# Patient Record
Sex: FEMALE | Race: WHITE | Employment: OTHER | URBAN - METROPOLITAN AREA
[De-identification: names, ages, dates, MRNs, and addresses within clinical notes are randomized per-mention and may not be internally consistent; named-entity substitution may affect disease eponyms.]

---

## 2017-01-12 ENCOUNTER — TELEPHONE (OUTPATIENT)
Dept: INTERNAL MEDICINE CLINIC | Age: 77
End: 2017-01-12

## 2017-01-12 NOTE — TELEPHONE ENCOUNTER
Every 4 months, the patient typically receives an epidural steroid injection at L4-L5 at [facility] with [facility] The Rx Hardcopy is on  Dr Ga huggins for a signature. Fax Number is [phone].

## 2017-01-12 NOTE — TELEPHONE ENCOUNTER
Pt called and stated she needs another Cortizone shot, so if Dr. Edwin Acevedo could put orders in so she could have it done at the radiology department.  Call back [phone]

## 2017-01-13 ENCOUNTER — TELEPHONE (OUTPATIENT)
Dept: INTERNAL MEDICINE CLINIC | Age: 77
End: 2017-01-13

## 2017-01-13 NOTE — TELEPHONE ENCOUNTER
Mr. Samky Teresa stated that he cannot make an appointment for the patient until he gets an authorization code from her insurance. He would like a call from her nurse. Delon Moore. [facility] [phone]

## 2017-01-18 ENCOUNTER — TELEPHONE (OUTPATIENT)
Dept: INTERNAL MEDICINE CLINIC | Age: 77
End: 2017-01-18

## 2017-01-18 NOTE — TELEPHONE ENCOUNTER
Left detailed message for Delon Moore. Our office has never obtained any authorizations from the insurance company for Ms Nur's injection. Her son works in radiology and in the past only requested that an handwritten prescription is sent into [facility]. In addition, our records show that the patient has Medicare B which would not need a referral.  Recommended following up with the patient's son for additional information or any other concerns.

## 2017-01-18 NOTE — TELEPHONE ENCOUNTER
Luisa Schilling called back to inform me that they will have the patient sign a waiver if the injection is not covered.

## 2017-01-18 NOTE — TELEPHONE ENCOUNTER
Ryland Mendez says its our responsibility to run insurance,He says he will not call and wait on the line again. He needs a script for patient.

## 2017-03-07 ENCOUNTER — OFFICE VISIT (OUTPATIENT)
Dept: INTERNAL MEDICINE CLINIC | Age: 77
End: 2017-03-07

## 2017-03-07 VITALS
RESPIRATION RATE: 20 BRPM | HEIGHT: 63 IN | HEART RATE: 70 BPM | WEIGHT: 161 LBS | TEMPERATURE: 98 F | BODY MASS INDEX: 28.53 KG/M2 | OXYGEN SATURATION: 97 % | DIASTOLIC BLOOD PRESSURE: 69 MMHG | SYSTOLIC BLOOD PRESSURE: 116 MMHG

## 2017-03-07 DIAGNOSIS — R07.89 CHEST TIGHTNESS OR PRESSURE: Primary | ICD-10-CM

## 2017-03-07 RX ORDER — GUAIFENESIN 100 MG/5ML
81 LIQUID (ML) ORAL DAILY
Qty: 30 TAB | Refills: 11 | Status: SHIPPED | OUTPATIENT
Start: 2017-03-07 | End: 2018-03-15

## 2017-03-07 NOTE — PROGRESS NOTES
Reviewed record in preparation for visit and have obtained necessary documentation. Identified pt with two pt identifiers(name and ). Health Maintenance Due   Topic    DTaP/Tdap/Td series (1 - Tdap)    ZOSTER VACCINE AGE 60>     GLAUCOMA SCREENING Q2Y     OSTEOPOROSIS SCREENING (DEXA)          Chief Complaint   Patient presents with    Chest Pain     tightness gone, but lasted 12-14 min, hard to breath,         Wt Readings from Last 3 Encounters:   17 161 lb (73 kg)   16 138 lb (62.6 kg)   03/25/15 158 lb (71.7 kg)     Temp Readings from Last 3 Encounters:   17 98 °F (36.7 °C) (Oral)   16 97.6 °F (36.4 °C) (Oral)   03/25/15 98.1 °F (36.7 °C) (Oral)     BP Readings from Last 3 Encounters:   17 116/69   16 118/66   16 134/68     Pulse Readings from Last 3 Encounters:   17 70   16 68   16 62           Learning Assessment:  :     Learning Assessment 2014   PRIMARY LEARNER Patient   PRIMARY LANGUAGE ENGLISH   LEARNER PREFERENCE PRIMARY LISTENING     READING   ANSWERED BY patient   RELATIONSHIP SELF       Depression Screening:  :     PHQ 2 / 9, over the last two weeks 3/7/2016   Little interest or pleasure in doing things Not at all   Feeling down, depressed or hopeless Not at all   Total Score PHQ 2 0       Fall Risk Assessment:  :     Fall Risk Assessment, last 12 mths 3/7/2016   Able to walk? Yes   Fall in past 12 months? No   Fall with injury? -   Number of falls in past 12 months -       Abuse Screening:  :     No flowsheet data found.     Coordination of Care Questionnaire:  :     1) Have you been to an emergency room, urgent care clinic since your last visit? no   Hospitalized since your last visit? no             2) Have you seen or consulted any other health care providers outside of [address] [place] [address] since your last visit? no  (Include any pap smears or colon screenings in this section.)    3) Do you have an Advance Directive on file? no    4) Are you interested in receiving information on Advance Directives? YES      Patient is accompanied by self I have received verbal consent from Sybil Darling to discuss any/all medical information while they are present in the room.

## 2017-03-07 NOTE — PATIENT INSTRUCTIONS
[facility] Activation    Thank you for requesting access to [facility]. Please follow the instructions below to securely access and download your online medical record. [facility] allows you to send messages to your doctor, view your test results, renew your prescriptions, schedule appointments, and more. How Do I Sign Up? 1. In your internet browser, go to [email or web address]  2. Click on the First Time User? Click Here link in the Sign In box. You will be redirect to the New Member Sign Up page. 3. Enter your [facility] Access Code exactly as it appears below. You will not need to use this code after youve completed the sign-up process. If you do not sign up before the expiration date, you must request a new code. [facility] Access Code: [email or web address]  Expires: 2017  4:19 PM (This is the date your [facility] access code will )    4. Enter the last four digits of your Social Security Number (xxxx) and Date of Birth (mm/dd/yyyy) as indicated and click Submit. You will be taken to the next sign-up page. 5. Create a [facility] ID. This will be your [facility] login ID and cannot be changed, so think of one that is secure and easy to remember. 6. Create a [facility] password. You can change your password at any time. 7. Enter your Password Reset Question and Answer. This can be used at a later time if you forget your password. 8. Enter your e-mail address. You will receive e-mail notification when new information is available in [address] 9. Click Sign Up. You can now view and download portions of your medical record. 10. Click the Download Summary menu link to download a portable copy of your medical information. Additional Information    If you have questions, please visit the Frequently Asked Questions section of the [facility] website at [email or web address] Remember, [facility] is NOT to be used for urgent needs. For medical emergencies, dial 911.

## 2017-03-07 NOTE — PROGRESS NOTES
Chief Complaint   Patient presents with    Chest Pain     tightness gone, but lasted 12-14 min, hard to breath,      Chest pain history:  Renata Archibald reports 1 days  12 minutes onset of chest discomfort described as tight in the both sides  chest.  The discomfort is intermittent (1-10 minutes). she is not currently having chest discomfort. she denies radiation of discomfort to the bilateral  arm. Associated symptoms include: shortness of breath. Cardiac risk factors include:  Age > 30, smoking and elevated cholesterol. she has not had a past history of cardiovascular disease and has not had recent work ups of chest pain. [facility]    03/07/17 1613   BP: 116/69   Pulse: 70   Resp: 20   Temp: 98 °F (36.7 °C)   TempSrc: Oral   SpO2: 97%   Weight: 161 lb (73 kg)   Height: 5' 3\" (1.6 m)     S1 and S2 normal, no murmurs, clicks, gallops or rubs. Regular rate and rhythm. Chest is clear; no wheezes or rales. No edema or JVD. Phillip Leon was seen today for chest pain. Diagnoses and all orders for this visit:    Chest tightness or pressure  -     REFERRAL TO CARDIOLOGY    Other orders  -     aspirin 81 mg chewable tablet; Take 1 Tab by mouth daily.       Advise see husbands cardilology in [facility]  practice

## 2017-06-05 ENCOUNTER — TELEPHONE (OUTPATIENT)
Dept: INTERNAL MEDICINE CLINIC | Age: 77
End: 2017-06-05

## 2017-06-05 NOTE — TELEPHONE ENCOUNTER
Patient requesting a script for medication for her Sciatica.  Also wants to know if she needs a Phumococal .

## 2017-06-06 ENCOUNTER — OFFICE VISIT (OUTPATIENT)
Dept: INTERNAL MEDICINE CLINIC | Age: 77
End: 2017-06-06

## 2017-06-06 VITALS
BODY MASS INDEX: 27.29 KG/M2 | RESPIRATION RATE: 16 BRPM | SYSTOLIC BLOOD PRESSURE: 102 MMHG | OXYGEN SATURATION: 98 % | DIASTOLIC BLOOD PRESSURE: 66 MMHG | WEIGHT: 154 LBS | HEART RATE: 80 BPM | HEIGHT: 63 IN

## 2017-06-06 DIAGNOSIS — M51.16 SCIATICA OF LEFT SIDE DUE TO DISPLACEMENT OF LUMBAR INTERVERTEBRAL DISC: Primary | ICD-10-CM

## 2017-06-06 RX ORDER — UREA 10 %
LOTION (ML) TOPICAL
COMMUNITY
End: 2019-10-23

## 2017-06-06 RX ORDER — METHYLPREDNISOLONE 4 MG/1
TABLET ORAL
Qty: 1 DOSE PACK | Refills: 0 | Status: SHIPPED | OUTPATIENT
Start: 2017-06-06 | End: 2018-03-15 | Stop reason: ALTCHOICE

## 2017-06-06 NOTE — PROGRESS NOTES
Chief Complaint   Patient presents with    Back Pain     Sciacta      SUBJECTIVE:   Josselyn Martinez is a 65 y.o. female who complains of low back pain for 3 week(s), positional with bending or lifting, with radiation down the legs. Precipitating factors: none recalled by the patient. Prior history of back problems: previous osteoarthritis of lumbar spine. There is no numbness in the legs. Had epidurals total 3   OBJECTIVE:  Visit Vitals    /66    Pulse 80  Comment: 80    Resp 16    Ht 5' 3\" (1.6 m)    Wt 154 lb (69.9 kg)    SpO2 98%    BMI 27.28 kg/m2      Patient appears to be in mild to moderate pain, antalgic gait noted. Lumbosacral spine area reveals no local tenderness or mass. Painful and reduced LS ROM noted. Straight leg raise is negative at 45 degrees on bilateral. DTR's, motor strength and sensation normal, including heel and toe gait. Peripheral pulses are palpable. X-Ray: shows DJD changes, likely chronic. ASSESSMENT:   degenerative disc disease without herniated disc and herniated disc likely at L4-5    PLAN:  For acute pain, rest, intermittent application of heat (do not sleep on heating pad), analgesics and muscle relaxants are recommended. Discussed longer term treatment plan of prn NSAID's and discussed a home back care exercise program with flexion exercise routine. Proper lifting with avoidance of heavy lifting discussed. Consider Physical Therapy and XRay studies if not improving. Call or return to clinic prn if these symptoms worsen or fail to improve as anticipated. Jose uJan Echeverria was seen today for back pain. Diagnoses and all orders for this visit:    Sciatica of left side due to displacement of lumbar intervertebral disc    Other orders  -     methylPREDNISolone (MEDROL DOSEPACK) 4 mg tablet;  As directed      Epidural at [facility] to be arranged hand faxed to radioplogy

## 2017-06-06 NOTE — MR AVS SNAPSHOT
Visit Information Date & Time Provider Department Dept. Phone Encounter #  
 6/6/2017  8:45 AM Aman Bob MD [facility] [phone] [id number] Upcoming Health Maintenance Date Due DTaP/Tdap/Td series (1 - Tdap) 2/18/1960 ZOSTER VACCINE AGE 60> 2/18/1999 GLAUCOMA SCREENING Q2Y 2/18/2004 OSTEOPOROSIS SCREENING (DEXA) 2/18/2004 INFLUENZA AGE 9 TO ADULT 8/1/2017 MEDICARE YEARLY EXAM 12/1/2017 COLONOSCOPY 4/7/2019 Allergies as of 6/6/2017  Review Complete On: 6/6/2017 By: Hira Toney LPN Severity Noted Reaction Type Reactions Penicillins  02/29/2012    Hives Sulfa (Sulfonamide Antibiotics)  02/29/2012    Itching, Drowsiness Current Immunizations  Reviewed on 11/30/2016 Name Date Influenza Vaccine 10/7/2014 Pneumococcal Conjugate (PCV-13) 10/12/2016 Pneumococcal Vaccine (Unspecified Type) 1/2/2009, 1/2/2004 Not reviewed this visit You Were Diagnosed With   
  
 Codes Comments Sciatica of left side due to displacement of lumbar intervertebral disc    -  Primary ICD-10-CM: M54.32 
ICD-9-CM: 722.10 Vitals BP Pulse Resp Height(growth percentile) Weight(growth percentile) SpO2  
 102/66 80 16 5' 3\" (1.6 m) 154 lb (69.9 kg) 98% BMI OB Status Smoking Status 27.28 kg/m2 Postmenopausal Former Smoker Vitals History BMI and BSA Data Body Mass Index Body Surface Area  
 27.28 kg/m 2 1.76 m 2 Preferred Pharmacy Pharmacy Name Phone [facility] #[id number]- [address] [phone] Your Updated Medication List  
  
   
This list is accurate as of: 6/6/17  9:28 AM.  Always use your most recent med list.  
  
  
  
  
 Allergy 25 mg capsule Generic drug:  diphenhydrAMINE Take 25 mg by mouth nightly as needed. aspirin 81 mg chewable tablet Take 1 Tab by mouth daily. atorvastatin 20 mg tablet Commonly known as:  LIPITOR Take 1 Tab by mouth nightly. levothyroxine 75 mcg tablet Commonly known as:  SYNTHROID  
TAKE 1 TABLET BY MOUTH EVERY DAY BEFORE BREAKFAST  
  
 melatonin 1 mg tablet Take  by mouth.  
  
 methylPREDNISolone 4 mg tablet Commonly known as:  Jackson Escalerains As directed METROGEL 1 % topical gel Generic drug:  metroNIDAZOLE Apply  to affected area daily. Use a thin layer to affected areas after washing  
  
 naproxen sodium 220 mg tablet Commonly known as:  NAPROSYN Take 440 mg by mouth two (2) times daily (with meals). NASONEX 50 mcg/actuation nasal spray Generic drug:  mometasone USE 2 SPRAYS BY BOTH NOSTRILS ROUTE DAILY. pneumococcal 13 akbar conj dip 0.5 mL Syrg injection Commonly known as:  PREVNAR-13  
0.5 mL by IntraMUSCular route once for 1 dose. triamcinolone acetonide 0.1 % ointment Commonly known as:  KENALOG Apply  to affected area two (2) times a day. use thin layer Prescriptions Printed Refills  
 pneumococcal 13 akbar conj dip (PREVNAR-13) 0.5 mL syrg injection 0 Si.5 mL by IntraMUSCular route once for 1 dose. Class: Print Route: IntraMUSCular Prescriptions Sent to Pharmacy Refills  
 methylPREDNISolone (MEDROL DOSEPACK) 4 mg tablet 0 Sig: As directed Class: Normal  
 Pharmacy: [id number] Bea Espinosa Ph #: [phone] Introducing [facility] & HEALTH SERVICES! Irais Cason introduces [employer] patient portal. Now you can access parts of your medical record, email your doctor's office, and request medication refills online. 1. In your internet browser, go to [email or web address] 2. Click on the First Time User? Click Here link in the Sign In box. You will see the New Member Sign Up page. 3. Enter your [employer] Access Code exactly as it appears below.  You will not need to use this code after youve completed the sign-up process. If you do not sign up before the expiration date, you must request a new code. · [employer] Access Code: [email or web address] Expires: 9/4/2017  9:27 AM 
 
4. Enter the last four digits of your Social Security Number (xxxx) and Date of Birth (mm/dd/yyyy) as indicated and click Submit. You will be taken to the next sign-up page. 5. Create a [employer] ID. This will be your [employer] login ID and cannot be changed, so think of one that is secure and easy to remember. 6. Create a [employer] password. You can change your password at any time. 7. Enter your Password Reset Question and Answer. This can be used at a later time if you forget your password. 8. Enter your e-mail address. You will receive e-mail notification when new information is available in [address] 9. Click Sign Up. You can now view and download portions of your medical record. 10. Click the Download Summary menu link to download a portable copy of your medical information. If you have questions, please visit the Frequently Asked Questions section of the [employer] website. Remember, [employer] is NOT to be used for urgent needs. For medical emergencies, dial 911. Now available from your iPhone and Android! Please provide this summary of care documentation to your next provider. Your primary care clinician is listed as Agnes Johnson. If you have any questions after today's visit, please call [phone].

## 2017-06-29 ENCOUNTER — TELEPHONE (OUTPATIENT)
Dept: INTERNAL MEDICINE CLINIC | Age: 77
End: 2017-06-29

## 2017-06-29 DIAGNOSIS — R09.89 BILATERAL CAROTID BRUITS: Primary | ICD-10-CM

## 2017-06-29 NOTE — LETTER
6/29/2017 11:03 AM 
 
Ms. [address] [facility] [facility] [place] [id number] Current Outpatient Prescriptions on File Prior to Visit Medication Sig Dispense Refill  melatonin 1 mg tablet Take  by mouth.  methylPREDNISolone (MEDROL DOSEPACK) 4 mg tablet As directed 1 Dose Pack 0  
 aspirin 81 mg chewable tablet Take 1 Tab by mouth daily. 30 Tab 11  
 levothyroxine (SYNTHROID) 75 mcg tablet TAKE 1 TABLET BY MOUTH EVERY DAY BEFORE BREAKFAST 90 Tab 3  
 naproxen sodium (NAPROSYN) 220 mg tablet Take 440 mg by mouth two (2) times daily (with meals).  NASONEX 50 mcg/actuation nasal spray USE 2 SPRAYS BY BOTH NOSTRILS ROUTE DAILY. 1 Container 2  
 atorvastatin (LIPITOR) 20 mg tablet Take 1 Tab by mouth nightly. 90 Tab 3  
 triamcinolone acetonide (KENALOG) 0.1 % ointment Apply  to affected area two (2) times a day. use thin layer 30 g 0  
 diphenhydrAMINE (ALLERGY) 25 mg capsule Take 25 mg by mouth nightly as needed.  metroNIDAZOLE (METROGEL) 1 % topical gel Apply  to affected area daily. Use a thin layer to affected areas after washing No current facility-administered medications on file prior to visit. Sincerely, Darrius Randall MD

## 2017-11-08 ENCOUNTER — TELEPHONE (OUTPATIENT)
Dept: INTERNAL MEDICINE CLINIC | Age: 77
End: 2017-11-08

## 2017-11-08 DIAGNOSIS — Z12.39 SCREENING FOR BREAST CANCER: Primary | ICD-10-CM

## 2017-11-08 NOTE — TELEPHONE ENCOUNTER
Patient is requesting an XRAY of her breast. I asked if she wanted an order for [facility] she said no an Xray, that nothing is wrong with her breast.

## 2017-11-09 NOTE — TELEPHONE ENCOUNTER
Patient is requesting an order for Mammogram because her sister was just diagnosed with breast cancer.  Please advise

## 2017-11-14 ENCOUNTER — TELEPHONE (OUTPATIENT)
Dept: INTERNAL MEDICINE CLINIC | Age: 77
End: 2017-11-14

## 2017-11-14 NOTE — TELEPHONE ENCOUNTER
Pt asking for a specialist in regards to si-add-I-ca (spell check) wants physician at [facility]

## 2017-11-15 ENCOUNTER — TELEPHONE (OUTPATIENT)
Dept: INTERNAL MEDICINE CLINIC | Age: 77
End: 2017-11-15

## 2017-11-21 ENCOUNTER — HOSPITAL ENCOUNTER (OUTPATIENT)
Dept: MAMMOGRAPHY | Age: 77
Discharge: HOME | End: 2017-11-21
Attending: INTERNAL MEDICINE
Payer: MEDICARE

## 2017-11-21 DIAGNOSIS — Z12.39 SCREENING FOR BREAST CANCER: ICD-10-CM

## 2017-11-21 PROCEDURE — 77063 BREAST TOMOSYNTHESIS BI: CPT

## 2018-02-18 DIAGNOSIS — E02 SUBCLINICAL IODINE-DEFICIENCY HYPOTHYROIDISM: Primary | ICD-10-CM

## 2018-02-18 DIAGNOSIS — E78.00 HYPERCHOLESTEREMIA: ICD-10-CM

## 2018-03-14 ENCOUNTER — HOSPITAL ENCOUNTER (OUTPATIENT)
Dept: LAB | Age: 78
Discharge: HOME | End: 2018-03-14
Payer: MEDICARE

## 2018-03-14 PROCEDURE — 85025 COMPLETE CBC W/AUTO DIFF WBC: CPT

## 2018-03-14 PROCEDURE — 36415 COLL VENOUS BLD VENIPUNCTURE: CPT

## 2018-03-14 PROCEDURE — 80053 COMPREHEN METABOLIC PANEL: CPT

## 2018-03-14 PROCEDURE — 80061 LIPID PANEL: CPT

## 2018-03-14 PROCEDURE — 84443 ASSAY THYROID STIM HORMONE: CPT

## 2018-03-15 ENCOUNTER — OFFICE VISIT (OUTPATIENT)
Dept: INTERNAL MEDICINE CLINIC | Age: 78
End: 2018-03-15

## 2018-03-15 VITALS
SYSTOLIC BLOOD PRESSURE: 100 MMHG | DIASTOLIC BLOOD PRESSURE: 60 MMHG | RESPIRATION RATE: 16 BRPM | OXYGEN SATURATION: 98 % | HEART RATE: 68 BPM

## 2018-03-15 DIAGNOSIS — Z00.00 MEDICARE ANNUAL WELLNESS VISIT, SUBSEQUENT: ICD-10-CM

## 2018-03-15 DIAGNOSIS — D75.1 POLYCYTHEMIA, SECONDARY: Primary | ICD-10-CM

## 2018-03-15 DIAGNOSIS — E02 SUBCLINICAL IODINE-DEFICIENCY HYPOTHYROIDISM: ICD-10-CM

## 2018-03-15 DIAGNOSIS — Z13.39 SCREENING FOR ALCOHOLISM: ICD-10-CM

## 2018-03-15 DIAGNOSIS — M17.10 ARTHRITIS OF KNEE: ICD-10-CM

## 2018-03-15 DIAGNOSIS — E78.00 HYPERCHOLESTEREMIA: ICD-10-CM

## 2018-03-15 LAB
ALBUMIN SERPL-MCNC: 3.9 G/DL (ref 3.5–4.8)
ALBUMIN/GLOB SERPL: 1.9 {RATIO} (ref 1.2–2.2)
ALP SERPL-CCNC: 96 IU/L (ref 39–117)
ALT SERPL-CCNC: 13 IU/L (ref 0–32)
AST SERPL-CCNC: 20 IU/L (ref 0–40)
BASOPHILS # BLD AUTO: 0.1 X10E3/UL (ref 0–0.2)
BASOPHILS NFR BLD AUTO: 1 %
BILIRUB SERPL-MCNC: 0.4 MG/DL (ref 0–1.2)
BUN SERPL-MCNC: 19 MG/DL (ref 8–27)
BUN/CREAT SERPL: 33 (ref 12–28)
CALCIUM SERPL-MCNC: 9.2 MG/DL (ref 8.7–10.3)
CHLORIDE SERPL-SCNC: 103 MMOL/L (ref 96–106)
CHOLEST SERPL-MCNC: 162 MG/DL (ref 100–199)
CO2 SERPL-SCNC: 24 MMOL/L (ref 18–29)
CREAT SERPL-MCNC: 0.58 MG/DL (ref 0.57–1)
EOSINOPHIL # BLD AUTO: 0.3 X10E3/UL (ref 0–0.4)
EOSINOPHIL NFR BLD AUTO: 4 %
ERYTHROCYTE [DISTWIDTH] IN BLOOD BY AUTOMATED COUNT: 12.6 % (ref 12.3–15.4)
GFR SERPLBLD CREATININE-BSD FMLA CKD-EPI: 101 ML/MIN/1.73
GFR SERPLBLD CREATININE-BSD FMLA CKD-EPI: 88 ML/MIN/1.73
GLOBULIN SER CALC-MCNC: 2.1 G/DL (ref 1.5–4.5)
GLUCOSE SERPL-MCNC: 90 MG/DL (ref 65–99)
HCT VFR BLD AUTO: 42.7 % (ref 34–46.6)
HDLC SERPL-MCNC: 75 MG/DL
HGB BLD-MCNC: 14 G/DL (ref 11.1–15.9)
IMM GRANULOCYTES # BLD: 0 X10E3/UL (ref 0–0.1)
IMM GRANULOCYTES NFR BLD: 0 %
INTERPRETATION, 910389: NORMAL
LDLC SERPL CALC-MCNC: 71 MG/DL (ref 0–99)
LYMPHOCYTES # BLD AUTO: 2.8 X10E3/UL (ref 0.7–3.1)
LYMPHOCYTES NFR BLD AUTO: 35 %
MCH RBC QN AUTO: 29.9 PG (ref 26.6–33)
MCHC RBC AUTO-ENTMCNC: 32.8 G/DL (ref 31.5–35.7)
MCV RBC AUTO: 91 FL (ref 79–97)
MONOCYTES # BLD AUTO: 0.8 X10E3/UL (ref 0.1–0.9)
MONOCYTES NFR BLD AUTO: 10 %
NEUTROPHILS # BLD AUTO: 4.1 X10E3/UL (ref 1.4–7)
NEUTROPHILS NFR BLD AUTO: 50 %
PLATELET # BLD AUTO: 277 X10E3/UL (ref 150–379)
POTASSIUM SERPL-SCNC: 3.7 MMOL/L (ref 3.5–5.2)
PROT SERPL-MCNC: 6 G/DL (ref 6–8.5)
RBC # BLD AUTO: 4.68 X10E6/UL (ref 3.77–5.28)
SODIUM SERPL-SCNC: 141 MMOL/L (ref 134–144)
TRIGL SERPL-MCNC: 80 MG/DL (ref 0–149)
TSH SERPL DL<=0.005 MIU/L-ACNC: 0.74 UIU/ML (ref 0.45–4.5)
VLDLC SERPL CALC-MCNC: 16 MG/DL (ref 5–40)
WBC # BLD AUTO: 8 X10E3/UL (ref 3.4–10.8)

## 2018-03-15 RX ORDER — ATORVASTATIN CALCIUM 20 MG/1
20 TABLET, FILM COATED ORAL
Qty: 90 TAB | Refills: 3 | Status: SHIPPED | OUTPATIENT
Start: 2018-03-15 | End: 2019-04-10 | Stop reason: SDUPTHER

## 2018-03-15 RX ORDER — LEVOTHYROXINE SODIUM 75 UG/1
TABLET ORAL
Qty: 90 TAB | Refills: 3 | Status: SHIPPED | OUTPATIENT
Start: 2018-03-15 | End: 2019-04-10 | Stop reason: SDUPTHER

## 2018-03-15 NOTE — MR AVS SNAPSHOT
[address] [place] [address] [address] [facility] 
[phone] Patient: Desi Pulido MRN: [id number] [id number] Visit Information Date & Time Provider Department Dept. Phone Encounter #  
 3/15/2018  8:30 AM Raymond Hazel MD [facility] [phone] [id number] Upcoming Health Maintenance Date Due DTaP/Tdap/Td series (1 - Tdap) 2/18/1960 ZOSTER VACCINE AGE 60> 12/18/1998 Bone Densitometry (Dexa) Screening 2/18/2004 Influenza Age 5 to Adult 8/1/2017 MEDICARE YEARLY EXAM 12/1/2017 COLONOSCOPY 4/7/2019 GLAUCOMA SCREENING Q2Y 3/15/2020 Allergies as of 3/15/2018  Review Complete On: 3/15/2018 By: Cori Lema LPN Severity Noted Reaction Type Reactions Penicillins  02/29/2012    Hives Sulfa (Sulfonamide Antibiotics)  02/29/2012    Itching, Drowsiness Current Immunizations  Reviewed on 3/15/2018 Name Date Influenza Vaccine 10/7/2014 Pneumococcal Conjugate (PCV-13) 6/6/2017, 10/12/2016 Pneumococcal Vaccine (Unspecified Type) 1/2/2009 ZZZ-RETIRED (DO NOT USE) Pneumococcal Vaccine (Unspecified Type) 1/2/2004 Reviewed by Raymond Hazel MD on 3/15/2018 at  8:54 AM  
You Were Diagnosed With   
  
 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  
  
   
This list is accurate as of 3/15/18  9:06 AM.  Always use your most recent med list.  
  
  
  
  
 Allergy 25 mg capsule Generic drug:  diphenhydrAMINE Take 25 mg by mouth nightly as needed. atorvastatin 20 mg tablet Commonly known as:  LIPITOR Take 1 Tab by mouth nightly. levothyroxine 75 mcg tablet Commonly known as:  SYNTHROID  
TAKE 1 TABLET BY MOUTH EVERY DAY BEFORE BREAKFAST  
  
 melatonin 1 mg tablet Take  by mouth. METROGEL 1 % topical gel Generic drug:  metroNIDAZOLE Apply  to affected area daily. Use a thin layer to affected areas after washing  
  
 naproxen sodium 220 mg tablet Commonly known as:  NAPROSYN Take 440 mg by mouth two (2) times daily (with meals). NASONEX 50 mcg/actuation nasal spray Generic drug:  mometasone USE 2 SPRAYS BY BOTH NOSTRILS ROUTE DAILY. triamcinolone acetonide 0.1 % ointment Commonly known as:  KENALOG Apply  to affected area two (2) times a day. use thin layer Prescriptions Sent to Pharmacy Refills  
 levothyroxine (SYNTHROID) 75 mcg tablet 3 Sig: TAKE 1 TABLET BY MOUTH EVERY DAY BEFORE BREAKFAST Class: Normal  
 Pharmacy: [facility] [id number] [place] [address] Ph #: [phone]  
 atorvastatin (LIPITOR) 20 mg tablet 3 Sig: Take 1 Tab by mouth nightly. Class: Normal  
 Pharmacy: [facility] [address] [place] [address] Ph #: [phone] Route: Oral  
  
We Performed the Following CBC WITH AUTOMATED DIFF [53125 CPT(R)] LIPID PANEL [91245 CPT(R)] METABOLIC PANEL, COMPREHENSIVE [23444 CPT(R)] TSH 3RD GENERATION [08668 CPT(R)] Introducing [facility] & HEALTH SERVICES! Alia Cruz introduces [employer] patient portal. Now you can access parts of your medical record, email your doctor's office, and request medication refills online. 1. In your internet browser, go to [email or web address] 2. Click on the First Time User? Click Here link in the Sign In box. You will see the New Member Sign Up page. 3. Enter your [employer] [facility] Access Code exactly as it appears below. You will not need to use this code after youve completed the sign-up process. If you do not sign up before the expiration date, you must request a new code. · [employer] [facility] [place] Access Code: [email or web address] Expires: 6/13/2018  9:05 AM 
 
4. Enter the last four digits of your Social Security Number (xxxx) and Date of Birth (mm/dd/yyyy) as indicated and click Submit. You will be taken to the next sign-up page. 5. Create a [employer] [facility] [place] ID. This will be your [employer] [facility] [facility] [place] login ID and cannot be changed, so think of one that is secure and easy to remember. 6. Create a [employer] [facility] [place] password. You can change your password at any time. 7. Enter your Password Reset Question and Answer. This can be used at a later time if you forget your password. 8. Enter your e-mail address. You will receive e-mail notification when new information is available in [address] 9. Click Sign Up. You can now view and download portions of your medical record. 10. Click the Download Summary menu link to download a portable copy of your medical information. If you have questions, please visit the Frequently Asked Questions section of the [employer] [facility] [place] website. Remember, [facility] [facility] [place] is NOT to be used for urgent needs. For medical emergencies, dial 911. Now available from your iPhone and Android! Please provide this summary of care documentation to your next provider. Your primary care clinician is listed as Sima Maynard. If you have any questions after today's visit, please call [phone].

## 2018-03-15 NOTE — PATIENT INSTRUCTIONS

## 2018-03-15 NOTE — PROGRESS NOTES
Chief Complaint   Patient presents with    Medication Refill         This is the Subsequent Medicare Annual Wellness Exam, performed 12 months or more after the Initial AWV or the last Subsequent AWV    I have reviewed the patient's medical history in detail and updated the computerized patient record. History     Past Medical History:   Diagnosis Date    Arthritis     Chronic pain 1998    feet pain    Hypercholesterolemia     Hypertension     borderline, no medication    Other ill-defined conditions(799.89)     peripheral artery disease in bilateral feet    Other ill-defined conditions(799.89)     incontinant of urine, chest cold dr appt today    Rosacea 4/7/2011    Thyroid disease     Tobacco abuse, episodic 4/7/2011      Past Surgical History:   Procedure Laterality Date    BREAST SURGERY PROCEDURE UNLISTED      right lumpectomy    ENDOSCOPY, COLON, DIAGNOSTIC      HX APPENDECTOMY      HX ORTHOPAEDIC      podiatry,arthroscopy on knees,mortons neuroma removed    HX ORTHOPAEDIC      knee repl;acement    HX UROLOGICAL      kidney stone removed     Current Outpatient Prescriptions   Medication Sig Dispense Refill    levothyroxine (SYNTHROID) 75 mcg tablet TAKE 1 TABLET BY MOUTH EVERY DAY BEFORE BREAKFAST 90 Tab 3    atorvastatin (LIPITOR) 20 mg tablet Take 1 Tab by mouth nightly. 90 Tab 3    NASONEX 50 mcg/actuation nasal spray USE 2 SPRAYS BY BOTH NOSTRILS ROUTE DAILY. 1 Container 2    triamcinolone acetonide (KENALOG) 0.1 % ointment Apply  to affected area two (2) times a day. use thin layer 30 g 0    melatonin 1 mg tablet Take  by mouth.  naproxen sodium (NAPROSYN) 220 mg tablet Take 440 mg by mouth two (2) times daily (with meals).  diphenhydrAMINE (ALLERGY) 25 mg capsule Take 25 mg by mouth nightly as needed.  metroNIDAZOLE (METROGEL) 1 % topical gel Apply  to affected area daily.  Use a thin layer to affected areas after washing        Allergies   Allergen Reactions    Penicillins Hives    Sulfa (Sulfonamide Antibiotics) Itching and Drowsiness     Family History   Problem Relation Age of Onset    Heart Disease Brother     Hypertension Brother     Elevated Lipids Brother     Elevated Lipids Mother     Elevated Lipids Father     Breast Cancer Maternal Aunt 54    Breast Cancer Maternal Aunt      age unknown    Breast Cancer Maternal Aunt      age unknown     Social History   Substance Use Topics    Smoking status: Former Smoker     Types: Cigarettes     Quit date: 6/10/2013    Smokeless tobacco: Never Used      Comment: 3-4 cigarettes monthly    Alcohol use Yes      Comment: monthly     Patient Active Problem List   Diagnosis Code    Hypercholesteremia E78.00    Hypothyroid E03.9    Allergic rhinitis J30.9    Elevated blood pressure reading without diagnosis of hypertension R03.0    Rosacea L71.9    Polycythemia, secondary D75.1       Depression Risk Factor Screening:     PHQ over the last two weeks 3/15/2018   Little interest or pleasure in doing things Not at all   Feeling down, depressed or hopeless Not at all   Total Score PHQ 2 0     Alcohol Risk Factor Screening: On any occasion in the past three months you have had more than 3 drinks containing alcohol. no  Functional Ability and Level of Safety:   Hearing Loss  Hearing is good. Activities of Daily Living  The home contains: no safety equipment. Patient does total self care    Fall Risk  Fall Risk Assessment, last 12 mths 3/15/2018   Able to walk? Yes   Fall in past 12 months?  No   Fall with injury? -   Number of falls in past 12 months -       Abuse Screen  Patient is not abused    Cognitive Screening   Evaluation of Cognitive Function:  Has your family/caregiver stated any concerns about your memory: no  Normal    Patient Care Team   Patient Care Team:  Jaja Ramos MD as PCP - General    Assessment/Plan   Education and counseling provided:  Are appropriate based on today's review and evaluation  End-of-Life planning (with patient's consent)    Diagnoses and all orders for this visit:    1. Polycythemia, secondary  -     CBC WITH AUTOMATED DIFF    2. Hypercholesteremia  -     LIPID PANEL  -     METABOLIC PANEL, COMPREHENSIVE  -     atorvastatin (LIPITOR) 20 mg tablet; Take 1 Tab by mouth nightly. 3. Subclinical iodine-deficiency hypothyroidism  -     TSH 3RD GENERATION    4. Medicare annual wellness visit, subsequent    5. Screening for alcoholism  -     Annual  Alcohol Screen 15 min ()    Other orders  -     levothyroxine (SYNTHROID) 75 mcg tablet; TAKE 1 TABLET BY MOUTH EVERY DAY BEFORE BREAKFAST        Health Maintenance Due   Topic Date Due    DTaP/Tdap/Td series (1 - Tdap) 02/18/1960    ZOSTER VACCINE AGE 60>  12/18/1998    Bone Densitometry (Dexa) Screening  02/18/2004    Influenza Age 5 to Adult  08/01/2017    MEDICARE YEARLY EXAM  12/01/2017     SUBJECTIVE: Ma Nurse is a 77 y.o. female seen for a follow up visit; she has hyperlipidemia and no known cardiovascular conditions. Current Outpatient Prescriptions   Medication Sig Dispense Refill    levothyroxine (SYNTHROID) 75 mcg tablet TAKE 1 TABLET BY MOUTH EVERY DAY BEFORE BREAKFAST 90 Tab 3    atorvastatin (LIPITOR) 20 mg tablet Take 1 Tab by mouth nightly. 90 Tab 3    NASONEX 50 mcg/actuation nasal spray USE 2 SPRAYS BY BOTH NOSTRILS ROUTE DAILY. 1 Container 2    triamcinolone acetonide (KENALOG) 0.1 % ointment Apply  to affected area two (2) times a day. use thin layer 30 g 0    melatonin 1 mg tablet Take  by mouth.  naproxen sodium (NAPROSYN) 220 mg tablet Take 440 mg by mouth two (2) times daily (with meals).  diphenhydrAMINE (ALLERGY) 25 mg capsule Take 25 mg by mouth nightly as needed.  metroNIDAZOLE (METROGEL) 1 % topical gel Apply  to affected area daily.  Use a thin layer to affected areas after washing        Patient Active Problem List   Diagnosis Code    Hypercholesteremia E78.00    Hypothyroid E03.9    Allergic rhinitis J30.9    Elevated blood pressure reading without diagnosis of hypertension R03.0    Rosacea L71.9    Polycythemia, secondary D75.1     System Review: Cardiovascular ROS - taking medications as instructed, no medication side effects noted, patient does not perform home BP monitoring, no TIA's, no chest pain on exertion, no dyspnea on exertion, no swelling of ankles. New concerns: will need another knee replacement    Thyroid ROS: denies fatigue, weight changes, heat/cold intolerance, bowel/skin changes or CVS symptoms. Lab Results   Component Value Date/Time    TSH 0.744 03/14/2018 04:12 PM     Exam: thyroid is normal in size without nodules or tenderness. Assessment:  hypothyroidism and Hashimoto's Disease stable. Plan: current treatment plan effective, no change in therapy  orders as documented in EMR. .     OBJECTIVE:  Visit Vitals    /60    Pulse 68    Resp 16    SpO2 98%      Appearance: alert, well appearing, and in no distress and oriented to person, place, and time. General exam: CVS exam BP noted to be well controlled today in office, S1, S2 normal, no gallop, no murmur, chest clear, no JVD, no HSM, no edema. Lab review: orders written for new lab studies as appropriate; see orders, no lab studies available for review at time of visit. ASSESSMENT:  hyperlipidemia reasonably well controlled. PLAN:  current treatment plan is effective, no change in therapy  lab results and schedule of future lab studies reviewed with patient  repeat labs ordered prior to next appointment  orders and follow up as documented in patient record. 1. Polycythemia, secondary  Was high when smoking off tobacco for 5 years  - CBC WITH AUTOMATED DIFF    2. Hypercholesteremia  follow  - LIPID PANEL  - METABOLIC PANEL, COMPREHENSIVE  - atorvastatin (LIPITOR) 20 mg tablet; Take 1 Tab by mouth nightly. Dispense: 90 Tab; Refill: 3    3.  Subclinical iodine-deficiency hypothyroidism  On RX   - TSH 3RD GENERATION    4. Medicare annual wellness visit, subsequent  Diet   Exercise classes discussed    5.  Screening for alcoholism  1 glass wine per day only  - Annual  Alcohol Screen 15 min ()  6 knee arthritis  Did well 1st surgery reviewed indications for another surgery

## 2018-06-21 ENCOUNTER — OFFICE VISIT (OUTPATIENT)
Dept: INTERNAL MEDICINE CLINIC | Age: 78
End: 2018-06-21

## 2018-06-21 VITALS
WEIGHT: 146 LBS | TEMPERATURE: 98.1 F | BODY MASS INDEX: 25.87 KG/M2 | SYSTOLIC BLOOD PRESSURE: 100 MMHG | HEIGHT: 63 IN | OXYGEN SATURATION: 99 % | DIASTOLIC BLOOD PRESSURE: 65 MMHG | RESPIRATION RATE: 16 BRPM | HEART RATE: 68 BPM

## 2018-06-21 DIAGNOSIS — R09.89 UNEQUAL BLOOD PRESSURE IN UPPER EXTREMITIES: Primary | ICD-10-CM

## 2018-06-21 DIAGNOSIS — R09.89 BRUIT OF LEFT CAROTID ARTERY: ICD-10-CM

## 2018-06-21 NOTE — PROGRESS NOTES
Sebastian Kaur is a 77 y.o. female  Chief Complaint   Patient presents with    Pre-op Exam     Right knee surgery 7/17/18     1. Have you been to the ER, urgent care clinic since your last visit? No     Hospitalized since your last visit?no    2. Have you seen or consulted any other health care providers outside of the [address] [place] [address] since your last visit? Include any pap smears or colon screening.  no

## 2018-06-21 NOTE — PROGRESS NOTES
No chief complaint on file. Subjective:  Ms. Heron Ruvalcaba is a 77year old white female with a history of degenerative arthritis. She's planning on having a second knee replacement in about a month. She has a history of dyslipidemia, on and off tobacco abuse, has not smoked for a couple years, but has been a heavy smoker for many years. She has been on a high dose statin for a long period of time. She denies chest pain or shortness of breath. She comes in because she went to her pharmacy recently to get some blood pressure checks and they told her that her blood pressure was very low. Then they made her sit for a while and her blood pressure came up. She was supposed to bring in the readings, but she didn't. Says she left them at home. She denies chest pain. Denies arm claudication. Denies TIA. She's not had any facial weakness, no unilateral weakness, no difficulty with vision and no difficulty with her speech. High risk knee replacement coming up. Physical Examination:  BP was as noted. She had a loud left carotid bruit. Both hands were warm. Her lungs were clear. She had a regular rhythm without a murmur. Impression/Plan:  Major blood pressure difference one arm to the other, loud left carotid bruit. Underlying vascular disease is a concern. I will schedule her for a carotid doppler and for an upper extremity doppler in addition. I wonder if she has subclavian disease. May need a referral to vascular. These findings would also suggest she probably has some underlying coronary disease and may need a cardiac workup. For knee surgery in July    Low BP at drug store last week        Vitals:    06/21/18 1432 06/21/18 1444 06/21/18 1445   BP: 130/74 125/70 100/65   Pulse: 68     Resp: 16     Temp: 98.1 °F (36.7 °C)     SpO2: 99%     Weight: 146 lb (66.2 kg)     Height: 5' 3\" (1.6 m)       1. Bruit of left carotid artery  eval indicated  - DUPLEX CAROTID BILATERAL;  Future  - DUPLEX UPPER EXT ARTERY BILAT; Future    2. Unequal blood pressure in upper extremities  Reviewed worries  - DUPLEX UPPER EXT ARTERY BILAT;  Future

## 2018-07-24 ENCOUNTER — PATIENT OUTREACH (OUTPATIENT)
Dept: INTERNAL MEDICINE CLINIC | Age: 78
End: 2018-07-24

## 2018-07-24 RX ORDER — ACETAMINOPHEN 500 MG
500 TABLET ORAL AS NEEDED
COMMUNITY

## 2018-07-24 NOTE — PROGRESS NOTES
Hospital Discharge Follow-Up      Date/Time:  2018 11:50 AM    Patient was admitted to [facility] on  and discharged on  for right TKA. The physician discharge summary was available at the time of outreach. Patient was contacted within 3 business days of discharge. Top Challenges reviewed with the provider   -Unable to tolerate ASA 325mg twice daily ( DVT prophylaxis). Stopped by ortho after dark/black emesis. No further issues after stopping ASA. Inpatient RRAT score: unavailable  Was this a readmission? no   Patient stated reason for the readmission: n/a    Nurse Navigator (NN) contacted the patient by telephone to perform post hospital discharge assessment. Verified name and  with patient as identifiers. Provided introduction to self, and explanation of the Nurse Navigator role. Reviewed discharge instructions and red flags with patient who verbalized understanding. Patient given an opportunity to ask questions and does not have any further questions or concerns at this time. The patient agrees to contact the PCP office for questions related to their healthcare. NN provided contact information for future reference. Summary of patient's top problems:  1. Unequal blood pressure in upper extremities- will need carotid and upper ext. Duplex. 2. Unsteady gait-post TKA. Follett Earing for ambulation, risk for falls. 3. Dark/ black emesis post op, ortho stopped ASA and had no further s/s of bleeding. Home Health orders at discharge: [facility]: At [address] (patient not sure)  Date of initial visit: patient has had 2 visits since discharge and expects 2 more then will go to outpatient therapy.     Durable Medical Equipment ordered/company: none  Durable Medical Equipment received: patient already has walker and BSC from previous knee replacement- last year    Barriers to care? none    Advance Care Planning:   Does patient have an Advance Directive:  not on file     Medication(s):   New Medications at Discharge: tylenol extra strength  Discontinued Medications at [address]  Per patient she is not taking the following- benadryl, and triamcinolone acetonide    Patient was given ASA 325mg twice daily for  DVT prophylaxis, but stopped by ortho. Patient verbalizes understanding of DVT prevention and s/s. Patient refused prescription for pain meds and is only taking tylenol. She is monitoring intake and takes only 500mg 3-4 times daily. She reports good pain control with \"almost no pain\" if not moving with activity she rates her pain at 4/10. Pain resolves with rest.  She is using ice and exercise 3 times daily. She elevates her leg when resting. She has been using warm prune juice to prevent constipation. She initially had issues with constipation, but is now back to normal bowel regimen. She denies any s/s of blood in stool. Medication reconciliation was performed with spouse, who verbalizes understanding of administration of home medications. There were no barriers to obtaining medications identified at this time. Referral to Pharm D needed: no     Current Outpatient Prescriptions   Medication Sig    acetaminophen (TYLENOL EXTRA STRENGTH) 500 mg tablet Take 500 mg by mouth every six (6) hours as needed for Pain.  levothyroxine (SYNTHROID) 75 mcg tablet TAKE 1 TABLET BY MOUTH EVERY DAY BEFORE BREAKFAST    atorvastatin (LIPITOR) 20 mg tablet Take 1 Tab by mouth nightly.  melatonin 1 mg tablet Take  by mouth.  NASONEX 50 mcg/actuation nasal spray USE 2 SPRAYS BY BOTH NOSTRILS ROUTE DAILY.  metroNIDAZOLE (METROGEL) 1 % topical gel Apply  to affected area daily. Use a thin layer to affected areas after washing      No current facility-administered medications for this visit.         Medications Discontinued During This Encounter   Medication Reason    naproxen sodium (NAPROSYN) 220 mg tablet Discontinued at Discharge    diphenhydrAMINE (ALLERGY) 25 mg capsule Discontinued at Discharge    triamcinolone acetonide (KENALOG) 0.1 % ointment Patient Discharge       [facility] follow up appointment(s): will follow up with  when patient is more mobile. Non-[facility] follow up appointment(s): - ortho follow up 7/30/18    Goals Addressed      home safety/fall prevention                  7/24  Patient will use a walker for ambulation. She will advance to cane or other assistive devices as recommended by therapy/ortho to prevent falls. BEB       Returns to baseline activity level. 7/24  Patient will continue to work with therapy. Currently ambulating with walker. Will progress to cane in the next week when follow up with ortho. BEB       Understands red flags post discharge. 7/24  Patient verbalizes understanding of post op complications. She will continue ankle pumps and ambulating every 1-2 hours to help prevent DVT. Patient aware of blood clot s/s. Elevating feet to reduce swelling.  BEB

## 2018-07-26 ENCOUNTER — OFFICE VISIT (OUTPATIENT)
Dept: INTERNAL MEDICINE CLINIC | Age: 78
End: 2018-07-26

## 2018-07-26 VITALS
RESPIRATION RATE: 18 BRPM | HEIGHT: 63 IN | SYSTOLIC BLOOD PRESSURE: 93 MMHG | TEMPERATURE: 97 F | HEART RATE: 70 BPM | OXYGEN SATURATION: 98 % | DIASTOLIC BLOOD PRESSURE: 61 MMHG

## 2018-07-26 DIAGNOSIS — Z96.651 TOTAL KNEE REPLACEMENT STATUS, RIGHT: ICD-10-CM

## 2018-07-26 DIAGNOSIS — K92.0 HEMATEMESIS WITH NAUSEA: Primary | ICD-10-CM

## 2018-07-26 DIAGNOSIS — Z78.9 DEEP VEIN THROMBOSIS (DVT) PROPHYLAXIS PRESCRIBED AT DISCHARGE: ICD-10-CM

## 2018-07-26 RX ORDER — FAMOTIDINE 40 MG/1
40 TABLET, FILM COATED ORAL 2 TIMES DAILY
Qty: 20 TAB | Refills: 0 | Status: SHIPPED | OUTPATIENT
Start: 2018-07-26 | End: 2018-08-05

## 2018-07-26 RX ORDER — CEPHALEXIN 500 MG/1
500 CAPSULE ORAL 3 TIMES DAILY
Qty: 15 CAP | Refills: 0 | Status: SHIPPED | OUTPATIENT
Start: 2018-07-26 | End: 2018-09-14 | Stop reason: ALTCHOICE

## 2018-07-26 NOTE — PROGRESS NOTES
10 days post op right TKR  On 8/12 vomited black liquid stopped asa 650 at that point    Subjective:  She had an elective total knee replacement done by Dr. Wendy Rush at [facility]. The surgery was done on the 16th. She was discharged about on the 18th. The day she got home the next morning she vomited some coffee ground material.  She was taking two aspirin daily as a DVT prophylaxis. She's been off aspirin for a week now. No history of previous GI bleed. No history of taking aspirin in the hospital that she remembers. We don't have the hospital inpatient record. Dr. Raulito Stahl office told her that she should see me. She denies abdominal pain, denies nausea, is eating well now. She says she's having no pain in the knee. She was concerned about the knee wound being a little oozy and some tenderness. Her  is a  and he was concerned about the wound too. She has home care coming out. Physical Examination:  She has venous insufficiency. She has some venous changes in her legs. The right calf is swollen, but this is normal postop. There is a little bit of redness and some drainage at the top of the knee replacement wound. Plan:  I will cover her for a postop infection with Cephalexin 500 t.i.d. for five days. She has an appointment to see orthopedics on Monday. I suggested Pepcid 40 b.i.d. for ten days because of the hematemesis. She could possibly start back on small dose aspirin. I would be reluctant to start her on a NOAC or other more powerful anticoagulants. She's going to continue to do ankle pumps, elevation and walk. She can go back on maybe one aspirin after she's been on the Pepcid and watched for any side effects or issues. Orthopedic appointment on Monday.     Patient Active Problem List    Diagnosis    Polycythemia, secondary    Elevated blood pressure reading without diagnosis of hypertension    Rosacea    Allergic rhinitis    Hypercholesteremia    Hypothyroid Vitals:    07/26/18 1006   BP: 93/61   Pulse: 70   Resp: 18   Temp: 97 °F (36.1 °C)   TempSrc: Oral   SpO2: 98%   Height: 5' 3\" (1.6 m)     no apparent distress      1. Hematemesis with nausea  Try pepcid for 10 days    2. Total knee replacement status, right      3. Deep vein thrombosis (DVT) prophylaxis prescribed at discharge  None for a week so at risk  Retry asa 325 one    4. Wound infection after surgery, initial encounter  Keflex for 5 days      Diagnoses and all orders for this visit:    1. Hematemesis with nausea    2. Total knee replacement status, right    3. Deep vein thrombosis (DVT) prophylaxis prescribed at discharge    4. Wound infection after surgery, initial encounter    Other orders  -     cephALEXin (KEFLEX) 500 mg capsule; Take 1 Cap by mouth three (3) times daily. -     famotidine (PEPCID) 40 mg tablet; Take 1 Tab by mouth two (2) times a day for 10 days.

## 2018-08-23 ENCOUNTER — PATIENT OUTREACH (OUTPATIENT)
Dept: INTERNAL MEDICINE CLINIC | Age: 78
End: 2018-08-23

## 2018-08-23 NOTE — PROGRESS NOTES
Patient has graduated from the Transitions of Care Coordination  program on 8/23. Patient's symptoms are stable at this time. Patient/family has the ability to self-manage. Care management goals have been completed at this time. No further nurse navigator follow up scheduled. Goals Addressed      COMPLETED: home safety/fall prevention                  7/24  Patient will use a walker for ambulation. She will advance to cane or other assistive devices as recommended by therapy/ortho to prevent falls. BEB       COMPLETED: Understands red flags post discharge. 7/24  Patient verbalizes understanding of post op complications. She will continue ankle pumps and ambulating every 1-2 hours to help prevent DVT. Patient aware of blood clot s/s. Elevating feet to reduce swelling. BEB            Pt has nurse navigator's contact information for any further questions, concerns, or needs. Patients upcoming visits:  No future appointments.

## 2018-08-30 DIAGNOSIS — R09.89 BRUIT OF LEFT CAROTID ARTERY: ICD-10-CM

## 2018-09-14 ENCOUNTER — OFFICE VISIT (OUTPATIENT)
Dept: INTERNAL MEDICINE CLINIC | Age: 78
End: 2018-09-14

## 2018-09-14 VITALS
WEIGHT: 145.2 LBS | OXYGEN SATURATION: 98 % | DIASTOLIC BLOOD PRESSURE: 74 MMHG | HEART RATE: 68 BPM | HEIGHT: 63 IN | TEMPERATURE: 96 F | BODY MASS INDEX: 25.73 KG/M2 | RESPIRATION RATE: 18 BRPM | SYSTOLIC BLOOD PRESSURE: 115 MMHG

## 2018-09-14 DIAGNOSIS — H93.13 TINNITUS OF BOTH EARS: Primary | ICD-10-CM

## 2018-09-14 PROBLEM — G45.8 SUBCLAVIAN STEAL SYNDROME: Status: ACTIVE | Noted: 2018-09-14

## 2018-09-14 NOTE — PROGRESS NOTES
1. Have you been to the ER, urgent care clinic since your last visit? Hospitalized since your last visit? No    2. Have you seen or consulted any other health care providers outside of the [facility] since your last visit? Include any pap smears or colon screening.  No

## 2018-09-14 NOTE — MR AVS SNAPSHOT
[address] [place] [address] [address] [place] [address] 
[phone] Patient: Gallito Connor MRN: [id number] EOU:[id number] Visit Information Date & Time Provider Department Dept. Phone Encounter #  
 9/14/2018  3:00 PM Derald Goodpasture, MD [address] [facility] [phone] [id number] Upcoming Health Maintenance Date Due DTaP/Tdap/Td series (1 - Tdap) 2/18/1960 ZOSTER VACCINE AGE 60> 12/18/1998 Bone Densitometry (Dexa) Screening 2/18/2004 Influenza Age 5 to Adult 8/1/2018 MEDICARE YEARLY EXAM 3/16/2019 COLONOSCOPY 4/7/2019 GLAUCOMA SCREENING Q2Y 3/15/2020 Allergies as of 9/14/2018  Review Complete On: 9/14/2018 By: Samantha Barry LPN Severity Noted Reaction Type Reactions Penicillins  02/29/2012    Hives Sulfa (Sulfonamide Antibiotics)  02/29/2012    Itching, Drowsiness Current Immunizations  Reviewed on 6/21/2018 Name Date Influenza Vaccine 10/7/2014 Pneumococcal Conjugate (PCV-13) 6/6/2017, 10/12/2016 Pneumococcal Vaccine (Unspecified Type) 1/2/2009 ZZZ-RETIRED (DO NOT USE) Pneumococcal Vaccine (Unspecified Type) 1/2/2004 Not reviewed this visit You Were Diagnosed With   
  
 Codes Comments Tinnitus of both ears    -  Primary ICD-10-CM: H93.13 
ICD-9-CM: 388.30 Vitals BP Pulse Temp Resp Height(growth percentile) Weight(growth percentile) 115/74 (BP 1 Location: Left arm, BP Patient Position: Sitting) 68 96 °F (35.6 °C) (Oral) 18 5' 3\" (1.6 m) 145 lb 3.2 oz (65.9 kg) SpO2 BMI OB Status Smoking Status 98% 25.72 kg/m2 Postmenopausal Former Smoker Vitals History BMI and BSA Data Body Mass Index Body Surface Area 25.72 kg/m 2 1.71 m 2 Preferred Pharmacy Pharmacy Name Phone [facility] [place] [address] [phone] Your Updated Medication List  
  
   
 This list is accurate as of 9/14/18  3:33 PM.  Always use your most recent med list.  
  
  
  
  
 atorvastatin 20 mg tablet Commonly known as:  LIPITOR Take 1 Tab by mouth nightly. levothyroxine 75 mcg tablet Commonly known as:  SYNTHROID  
TAKE 1 TABLET BY MOUTH EVERY DAY BEFORE BREAKFAST  
  
 melatonin 1 mg tablet Take  by mouth. NASONEX 50 mcg/actuation nasal spray Generic drug:  mometasone USE 2 SPRAYS BY BOTH NOSTRILS ROUTE DAILY. TYLENOL EXTRA STRENGTH 500 mg tablet Generic drug:  acetaminophen Take 500 mg by mouth every six (6) hours as needed for Pain. We Performed the Following REFERRAL TO AUDIOLOGY [REF7 Custom] Referral Information Referral ID Referred By Referred To  
  
 [id number] Delia RAY Not Available Visits Status Start Date End Date 1 New Request 9/14/18 9/14/19 If your referral has a status of pending review or denied, additional information will be sent to support the outcome of this decision. Introducing [facility]! [facility] introduces [employer] patient portal. Now you can access parts of your medical record, email your doctor's office, and request medication refills online. 1. In your internet browser, go to [email or web address] 2. Click on the First Time User? Click Here link in the Sign In box. You will see the New Member Sign Up page. 3. Enter your [employer] Access Code exactly as it appears below. You will not need to use this code after youve completed the sign-up process. If you do not sign up before the expiration date, you must request a new code. · [employer] Access Code: [email or web address] Expires: 12/13/2018  3:33 PM 
 
4. Enter the last four digits of your Social Security Number (xxxx) and Date of Birth (mm/dd/yyyy) as indicated and click Submit. You will be taken to the next sign-up page. 5. Create a [employer] ID.  This will be your [employer] login ID and cannot be changed, so think of one that is secure and easy to remember. 6. Create a [employer] password. You can change your password at any time. 7. Enter your Password Reset Question and Answer. This can be used at a later time if you forget your password. 8. Enter your e-mail address. You will receive e-mail notification when new information is available in [address] 9. Click Sign Up. You can now view and download portions of your medical record. 10. Click the Download Summary menu link to download a portable copy of your medical information. If you have questions, please visit the Frequently Asked Questions section of the [employer] website. Remember, [employer] is NOT to be used for urgent needs. For medical emergencies, dial 911. Now available from your iPhone and Android! Please provide this summary of care documentation to your next provider. Your primary care clinician is listed as Babita Young. If you have any questions after today's visit, please call [phone].

## 2018-09-15 NOTE — PROGRESS NOTES
Chief Complaint   Patient presents with    Ringing in Ear     Subjective:  Has a history of mild hearing loss and low grade tinnitus for a number of years. Recently the tinnitus has been more pronounced. It's not pulsatile, continuous high pitch. She's noted some difficulty with hearing in groups. Denies any earache or ear pain. She's not had any upper respiratory symptoms or sinus congestion. She has been recovering from a knee replacement nicely. Meds were reviewed. She's had no nystagmus. No major vertigo type symptoms or ataxia. Physical Examination:  Her blood pressure was normal. Cardiac regular. No neck mass. Throat was normal.  Ears were normal with no serious issues. She could hear a tuning fork bilaterally, didn't seem to lateralize. Plan:  She has tinnitus probably related to hearing loss and I will refer her to audiology for evaluation. Patient Active Problem List    Diagnosis    Subclavian steal syndrome    Polycythemia, secondary    Elevated blood pressure reading without diagnosis of hypertension    Rosacea    Allergic rhinitis    Hypercholesteremia    Hypothyroid     Vitals:    09/14/18 1505   BP: 115/74   Pulse: 68   Resp: 18   Temp: 96 °F (35.6 °C)   TempSrc: Oral   SpO2: 98%   Weight: 145 lb 3.2 oz (65.9 kg)   Height: 5' 3\" (1.6 m)     Please see systems review in HPI. Diagnoses and all orders for this visit:    1.  Tinnitus of both ears  -     REFERRAL TO AUDIOLOGY

## 2019-02-20 ENCOUNTER — OFFICE VISIT (OUTPATIENT)
Dept: INTERNAL MEDICINE CLINIC | Age: 79
End: 2019-02-20

## 2019-02-20 VITALS
SYSTOLIC BLOOD PRESSURE: 122 MMHG | HEIGHT: 63 IN | RESPIRATION RATE: 18 BRPM | BODY MASS INDEX: 26.22 KG/M2 | OXYGEN SATURATION: 100 % | DIASTOLIC BLOOD PRESSURE: 80 MMHG | WEIGHT: 148 LBS | HEART RATE: 63 BPM | TEMPERATURE: 96.3 F

## 2019-02-20 DIAGNOSIS — M89.8X1 CHRONIC SCAPULAR PAIN: Primary | ICD-10-CM

## 2019-02-20 DIAGNOSIS — G89.29 CHRONIC SCAPULAR PAIN: Primary | ICD-10-CM

## 2019-02-20 NOTE — PROGRESS NOTES
Subjective: Chief Complaint Patient presents with  Pain (Chronic)  
  sciatica  Back Pain  
  left thoracic or scapular pain not respiratory no injury or fall  better some with aleve She is a [de-identified]y.o. year old female who presents for evaluation. Objective:  
 
Vitals:  
 02/20/19 1500 02/20/19 1518 BP: 190/88 122/80 Pulse: 63 Resp: 18 Temp: 96.3 °F (35.7 °C) TempSrc: Oral   
SpO2: 100% Weight: 148 lb (67.1 kg) Height: 5' 3\" (1.6 m) Physical Examination: General appearance - alert, well appearing, and in no distress, oriented to person, place, and time and normal appearing weight Neck - supple, no significant adenopathy Chest - clear to auscultation, no wheezes, rales or rhonchi, symmetric air entry No rash has minor tenderness in  Thoracic spine and scapular area Heart - normal rate, regular rhythm, normal S1, S2, no murmurs, rubs, clicks or gallops Abdomen - soft, nontender, nondistended, no masses or organomegaly Extremities - peripheral pulses normal, no pedal edema, no clubbing or cyanosis Assessment/ Plan: ICD-10-CM ICD-9-CM 1. Chronic scapular pain M89.8X1 733.90 XR SPINE THORAC 3 V  
 G89.29 338.29 Use tylenol and aleve rule out compression Follow-up Disposition: Not on File

## 2019-02-20 NOTE — PROGRESS NOTES
1. Have you been to the ER, urgent care clinic since your last visit? Hospitalized since your last visit? No 
 
2. Have you seen or consulted any other health care providers outside of the [address] [place] [address] since your last visit? Include any pap smears or colon screening.  No

## 2019-03-22 ENCOUNTER — OFFICE VISIT (OUTPATIENT)
Dept: INTERNAL MEDICINE CLINIC | Age: 79
End: 2019-03-22

## 2019-03-22 ENCOUNTER — HOSPITAL ENCOUNTER (OUTPATIENT)
Dept: LAB | Age: 79
Discharge: HOME | End: 2019-03-22
Payer: MEDICARE

## 2019-03-22 VITALS
HEART RATE: 66 BPM | RESPIRATION RATE: 20 BRPM | DIASTOLIC BLOOD PRESSURE: 73 MMHG | WEIGHT: 147 LBS | OXYGEN SATURATION: 98 % | HEIGHT: 63 IN | SYSTOLIC BLOOD PRESSURE: 139 MMHG | BODY MASS INDEX: 26.05 KG/M2 | TEMPERATURE: 97.5 F

## 2019-03-22 DIAGNOSIS — M79.674 GREAT TOE PAIN, RIGHT: Primary | ICD-10-CM

## 2019-03-22 DIAGNOSIS — R51.9 TEMPORAL PAIN: ICD-10-CM

## 2019-03-22 PROCEDURE — 86141 C-REACTIVE PROTEIN HS: CPT

## 2019-03-22 PROCEDURE — 36415 COLL VENOUS BLD VENIPUNCTURE: CPT

## 2019-03-22 PROCEDURE — 85651 RBC SED RATE NONAUTOMATED: CPT

## 2019-03-22 NOTE — PROGRESS NOTES
Bilateral foot pain, achy, tender, painful to stand, patient does not have a podiatrist or neurologist.

## 2019-03-23 LAB
CRP SERPL HS-MCNC: 0.48 MG/L (ref 0–3)
ERYTHROCYTE [SEDIMENTATION RATE] IN BLOOD BY WESTERGREN METHOD: 4 MM/HR (ref 0–40)

## 2019-03-24 NOTE — PROGRESS NOTES
Archana Mckeon is a [de-identified] y.o. female. HPI  Patient presents to the office for two concerns. The first concern is about her feet. She reports she has had previous foot surgeries in the past. Her most recent concern has to do with her big toe. She had a callus on her big toe and her  tried cutting it off. She states now the toe is tender and he was concerned her toe was infected so he advised her to be seen. She states she has not seen a podiatrist in many years but she know she should see one. Her second concern has to do with intermittent quick little sharp pain of her right temporal area. She states this has been going on for about 2 days or so. She states it is not constant and it does not effect her vision. No changes in speech or sensation. She has taken tylenol and it does seem to take the edge off. Review of Systems   Constitutional: Negative for chills and fever. Eyes: Negative for blurred vision, double vision, photophobia and pain. Neurological: Positive for headaches. Negative for dizziness, tingling, tremors, sensory change and speech change. Blood pressure 139/73, pulse 66, temperature 97.5 °F (36.4 °C), temperature source Oral, resp. rate 20, height 5' 3\" (1.6 m), weight 147 lb (66.7 kg), SpO2 98 %. Physical Exam   Constitutional: She appears well-developed and well-nourished. No distress. HENT:   TM's intact  Mild tenderness of the temporal area. Not able to appreciate edema of the temporal area. Eyes: Pupils are equal, round, and reactive to light. Conjunctivae are normal.   Cardiovascular: Normal rate and regular rhythm. Pulmonary/Chest: Effort normal and breath sounds normal.   Musculoskeletal: She exhibits tenderness and deformity. Right great toe- callus noted along the side of her great toe, at the top of callus appears to have a piece cut off and there is a scab and erythema. The toe is tender to palpation.  Right foot- some deformity noted of the toes  Left foot- deformity of toes noted. Spider veins noted along the lateral aspect of the foot       ASSESSMENT and PLAN  Diagnoses and all orders for this visit:    1. Great toe pain, right  -     REFERRAL TO PODIATRY    2. Temporal pain  -     SED RATE (ESR)  -     CRP, HIGH SENSITIVITY    I have made the patient an appointment to see the podiatrist today at 12:30. Meanwhile I have advised her no more cutting on her foot. I would like for her to get some labs done today. If the headache increase she is to see medical attention right away. She will be contact with the results of her labs once back. All this was discussed with the patient and she understands and agrees.

## 2019-03-24 NOTE — PROGRESS NOTES
Please let the patient know her labs are normal. If she is still having the pain, I would like for her to see Dr. Ortega Hansen since Dr. Alondra Stewart is out of the office this week.  thanks

## 2019-03-25 NOTE — PROGRESS NOTES
Please let the patient know her labs are normal. If she is still having the pain, I would like for her to see Dr. Jarred Romo since Dr. Odin Andrade is out of the office this week.  thanks

## 2019-03-26 ENCOUNTER — TELEPHONE (OUTPATIENT)
Dept: INTERNAL MEDICINE CLINIC | Age: 79
End: 2019-03-26

## 2019-03-26 NOTE — TELEPHONE ENCOUNTER
----- Message from Shruthi White PA-C sent at 3/23/2019  8:14 PM EDT -----  Please let the patient know her labs are normal. If she is still having the pain, I would like for her to see Dr. Mirian Pavon since Dr. Nasrin Aponte is out of the office this week.  thanks

## 2019-03-26 NOTE — TELEPHONE ENCOUNTER
Patient informed with results and states her pain is better. Advised patient if things change to call us back and make an appointment.

## 2019-04-10 DIAGNOSIS — E78.00 HYPERCHOLESTEREMIA: ICD-10-CM

## 2019-04-10 RX ORDER — LEVOTHYROXINE SODIUM 75 UG/1
TABLET ORAL
Qty: 90 TAB | Refills: 3 | Status: SHIPPED | OUTPATIENT
Start: 2019-04-10 | End: 2020-06-07

## 2019-04-10 RX ORDER — ATORVASTATIN CALCIUM 20 MG/1
20 TABLET, FILM COATED ORAL
Qty: 90 TAB | Refills: 3 | Status: SHIPPED | OUTPATIENT
Start: 2019-04-10 | End: 2019-10-23 | Stop reason: SDUPTHER

## 2019-05-02 ENCOUNTER — TELEPHONE (OUTPATIENT)
Dept: INTERNAL MEDICINE CLINIC | Age: 79
End: 2019-05-02

## 2019-05-02 NOTE — TELEPHONE ENCOUNTER
Writer took triage call for patient regarding left head stabbing pains lasting 20-30 seconds, stops and starts again in about 5 minutes, no other symptoms. Writer advised per Aliza visit \" If the headache increase she is to see medical attention right away. \" patient will be going to [address] [place] [address].

## 2019-07-29 ENCOUNTER — OFFICE VISIT (OUTPATIENT)
Dept: INTERNAL MEDICINE CLINIC | Age: 79
End: 2019-07-29

## 2019-07-29 VITALS
TEMPERATURE: 96.2 F | OXYGEN SATURATION: 97 % | RESPIRATION RATE: 18 BRPM | WEIGHT: 147 LBS | BODY MASS INDEX: 26.05 KG/M2 | HEART RATE: 67 BPM | DIASTOLIC BLOOD PRESSURE: 72 MMHG | HEIGHT: 63 IN | SYSTOLIC BLOOD PRESSURE: 145 MMHG

## 2019-07-29 DIAGNOSIS — R41.89 COGNITIVE IMPAIRMENT: Primary | ICD-10-CM

## 2019-07-29 DIAGNOSIS — R20.2 TINGLING IN EXTREMITIES: ICD-10-CM

## 2019-07-29 DIAGNOSIS — M48.061 SPINAL STENOSIS AT L4-L5 LEVEL: ICD-10-CM

## 2019-07-29 DIAGNOSIS — E78.00 HYPERCHOLESTEREMIA: ICD-10-CM

## 2019-07-29 DIAGNOSIS — E02 SUBCLINICAL IODINE-DEFICIENCY HYPOTHYROIDISM: ICD-10-CM

## 2019-07-29 NOTE — PROGRESS NOTES
1. Have you been to the ER, urgent care clinic since your last visit? Hospitalized since your last visit? No    2. Have you seen or consulted any other health care providers outside of the [address] [place] [address] since your last visit? Include any pap smears or colon screening. Yes.   Dr Bud Read

## 2019-07-30 NOTE — PROGRESS NOTES
No chief complaint on file. Subjective: This is an [de-identified]year old female here today with her , Jono Elizalde, retired . Ms. Senait Carlson has had recurrent lower back pain. She has had sciatic pain radiating down her legs when she does anything physical.  I referred her to Dr. Tony Garcia from [facility]. They did not do much, said that spinal stenosis is a chronic illness and just live with it, take ibuprofen and that there is no specific medicine. She has had a couple epidurals and they have given her some temporary relief. She has had TKRs. She tries stretching. She has tried a TENS unit. She has not tried acupuncture. She and her  have looked into other options. Right now she is using Aleve and Tylenol for pain. She also has had some decline in her mentation over the last year with confusion. The  says, \"I think my wife has some dementia\". There is a history in the family. 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 She has had to slow down a lot because of her back pain, has not been quite as active. Cannot stand for any long period of time. I reviewed Dr. Duran Arizmendi note, he felt there was not too much to do and I guess he looked at her old MRI from a couple years ago. He said if her symptoms worsen she should get another MRI. He did send her to PT, that did not seem to help very much. Physical Examination:  Her blood pressure was mildly elevated in the office. S1, S2 regular. Lungs clear. She was alert and oriented x three. She kept turning to her  to ask him information. We did not have enough time to do a full mini mental status exam.      Plan:  1. She has seen neuro before, but that was for benign headaches.   I will refer [facility] to [facility] because she wants to stay within that system ( [facility]) and I will see what they think, Dr. Nedra Ferrara. 2. I have discussed acupuncture, Dr. Severo Grime ____tim___________, Dr. Marques Lomeli, who is a chiropractor, was discussed, and names given to the patient and her . 3. Follow up again with Dr. Matt Zuluaga would also be an alternative to see what else he would recommend, if anything. 4. I did discourage her from back surgery. She certainly is not a candidate. for that    Patient Active Problem List    Diagnosis    Spinal stenosis at L4-L5 level    Subclavian steal syndrome    Polycythemia, secondary    Elevated blood pressure reading without diagnosis of hypertension    Rosacea    Allergic rhinitis    Hypercholesteremia    Hypothyroid     Vitals:    07/29/19 1552 07/29/19 1634   BP: 174/80 145/72   Pulse: 67    Resp: 18    Temp: 96.2 °F (35.7 °C)    TempSrc: Oral    SpO2: 97%    Weight: 147 lb (66.7 kg)    Height: 5' 3\" (1.6 m)      . 1. Cognitive impairment  eval indicated  - REFERRAL TO NEUROLOGY  - VITAMIN B12    2. Spinal stenosis at L4-L5 level  Not too many options she needs to slow down    3. Hypercholesteremia  labs  - CBC WITH AUTOMATED DIFF  - LIPID PANEL  - METABOLIC PANEL, COMPREHENSIVE    4. Subclinical iodine-deficiency hypothyroidism  lbs  - TSH 3RD GENERATION    5. Tingling in extremities    - VITAMIN B12  Hypertension (elevated blood pressure)  - My Recommendations  -Purchase a blood pressure cuff that goes around your upper arm and check blood pressure at least 3 times per week. Write down your numbers for review. Check blood pressure in the morning and evening. Rest for 5 minutes with feet elevated and arm resting on a table (at mid-chest level) when checking blood pressure  -Exercise 30-60 minutes most days of the week, preferably with a mix of cardiovascular and strength training.    Exercise can improve blood pressure, even if you do not lose weight!  -Limit alcohol intake to less than 2-3 drinks daily   -Avoid tobacco products  -Avoid caffeine, energy drinks, stimulants  -DASH diet - includes fruits, vegetables, fiber, and less than 2000 mg sodium (salt) daily. Try to eat less than 9340-1276 calories daily.    Limit fat intake.    -Avoid non-steroidal inflammatory medications (NSAIDS) such as ibuprofen, advil, motrin, aleve, and naproxyn as these may increase blood pressure if used long-term  -Avoid OTC decongestants such as pseudopherine, phenylephrine, Afrin

## 2019-07-31 ENCOUNTER — HOSPITAL ENCOUNTER (OUTPATIENT)
Dept: LAB | Age: 79
Discharge: HOME | End: 2019-07-31
Payer: MEDICARE

## 2019-07-31 PROCEDURE — 82607 VITAMIN B-12: CPT

## 2019-07-31 PROCEDURE — 80053 COMPREHEN METABOLIC PANEL: CPT

## 2019-07-31 PROCEDURE — 84443 ASSAY THYROID STIM HORMONE: CPT

## 2019-07-31 PROCEDURE — 85025 COMPLETE CBC W/AUTO DIFF WBC: CPT

## 2019-07-31 PROCEDURE — 36415 COLL VENOUS BLD VENIPUNCTURE: CPT

## 2019-07-31 PROCEDURE — 80061 LIPID PANEL: CPT

## 2019-08-01 LAB
ALBUMIN SERPL-MCNC: 3.9 G/DL (ref 3.5–4.7)
ALBUMIN/GLOB SERPL: 1.8 {RATIO} (ref 1.2–2.2)
ALP SERPL-CCNC: 87 IU/L (ref 39–117)
ALT SERPL-CCNC: 14 IU/L (ref 0–32)
AST SERPL-CCNC: 18 IU/L (ref 0–40)
BASOPHILS # BLD AUTO: 0 X10E3/UL (ref 0–0.2)
BASOPHILS NFR BLD AUTO: 1 %
BILIRUB SERPL-MCNC: 0.6 MG/DL (ref 0–1.2)
BUN SERPL-MCNC: 24 MG/DL (ref 8–27)
BUN/CREAT SERPL: 26 (ref 12–28)
CALCIUM SERPL-MCNC: 9.4 MG/DL (ref 8.7–10.3)
CHLORIDE SERPL-SCNC: 102 MMOL/L (ref 96–106)
CHOLEST SERPL-MCNC: 147 MG/DL (ref 100–199)
CO2 SERPL-SCNC: 24 MMOL/L (ref 20–29)
CREAT SERPL-MCNC: 0.91 MG/DL (ref 0.57–1)
EOSINOPHIL # BLD AUTO: 0.2 X10E3/UL (ref 0–0.4)
EOSINOPHIL NFR BLD AUTO: 3 %
ERYTHROCYTE [DISTWIDTH] IN BLOOD BY AUTOMATED COUNT: 13.2 % (ref 12.3–15.4)
GLOBULIN SER CALC-MCNC: 2.2 G/DL (ref 1.5–4.5)
GLUCOSE SERPL-MCNC: 88 MG/DL (ref 65–99)
HCT VFR BLD AUTO: 43.5 % (ref 34–46.6)
HDLC SERPL-MCNC: 62 MG/DL
HGB BLD-MCNC: 14.2 G/DL (ref 11.1–15.9)
IMM GRANULOCYTES # BLD AUTO: 0 X10E3/UL (ref 0–0.1)
IMM GRANULOCYTES NFR BLD AUTO: 0 %
INTERPRETATION, 910389: NORMAL
LDLC SERPL CALC-MCNC: 75 MG/DL (ref 0–99)
LYMPHOCYTES # BLD AUTO: 2.4 X10E3/UL (ref 0.7–3.1)
LYMPHOCYTES NFR BLD AUTO: 31 %
MCH RBC QN AUTO: 30.9 PG (ref 26.6–33)
MCHC RBC AUTO-ENTMCNC: 32.6 G/DL (ref 31.5–35.7)
MCV RBC AUTO: 95 FL (ref 79–97)
MONOCYTES # BLD AUTO: 0.7 X10E3/UL (ref 0.1–0.9)
MONOCYTES NFR BLD AUTO: 9 %
NEUTROPHILS # BLD AUTO: 4.5 X10E3/UL (ref 1.4–7)
NEUTROPHILS NFR BLD AUTO: 56 %
PLATELET # BLD AUTO: 305 X10E3/UL (ref 150–450)
POTASSIUM SERPL-SCNC: 4.6 MMOL/L (ref 3.5–5.2)
PROT SERPL-MCNC: 6.1 G/DL (ref 6–8.5)
RBC # BLD AUTO: 4.6 X10E6/UL (ref 3.77–5.28)
SODIUM SERPL-SCNC: 139 MMOL/L (ref 134–144)
TRIGL SERPL-MCNC: 48 MG/DL (ref 0–149)
TSH SERPL DL<=0.005 MIU/L-ACNC: 0.53 UIU/ML (ref 0.45–4.5)
VIT B12 SERPL-MCNC: 347 PG/ML (ref 232–1245)
VLDLC SERPL CALC-MCNC: 10 MG/DL (ref 5–40)
WBC # BLD AUTO: 7.8 X10E3/UL (ref 3.4–10.8)

## 2019-08-06 ENCOUNTER — TELEPHONE (OUTPATIENT)
Dept: INTERNAL MEDICINE CLINIC | Age: 79
End: 2019-08-06

## 2019-08-06 NOTE — TELEPHONE ENCOUNTER
Pt is requesting the phone number to the acupuncture doctor she was recommended to see. Pts number is [phone].       zahraa

## 2019-08-07 NOTE — TELEPHONE ENCOUNTER
Spoke with patient.  Advised patient with name and numbers of Dr Joel Davison [phone] and DR Teresa Pelletier [phone]

## 2019-08-19 ENCOUNTER — TELEPHONE (OUTPATIENT)
Dept: INTERNAL MEDICINE CLINIC | Age: 79
End: 2019-08-19

## 2019-08-19 NOTE — TELEPHONE ENCOUNTER
Gave patient information to who Dr. Antionette Motley previously recommended---Dr Shana Watts [phone] and DR Hudson Lopez [phone]

## 2019-10-23 ENCOUNTER — OFFICE VISIT (OUTPATIENT)
Dept: INTERNAL MEDICINE CLINIC | Age: 79
End: 2019-10-23

## 2019-10-23 ENCOUNTER — HOSPITAL ENCOUNTER (OUTPATIENT)
Dept: LAB | Age: 79
Discharge: HOME | End: 2019-10-23
Payer: MEDICARE

## 2019-10-23 VITALS
BODY MASS INDEX: 24.98 KG/M2 | TEMPERATURE: 97.6 F | OXYGEN SATURATION: 98 % | WEIGHT: 141 LBS | HEIGHT: 63 IN | SYSTOLIC BLOOD PRESSURE: 93 MMHG | RESPIRATION RATE: 18 BRPM | HEART RATE: 68 BPM | DIASTOLIC BLOOD PRESSURE: 72 MMHG

## 2019-10-23 DIAGNOSIS — K92.1 BLACK STOOL: ICD-10-CM

## 2019-10-23 DIAGNOSIS — Z12.11 SCREEN FOR COLON CANCER: ICD-10-CM

## 2019-10-23 DIAGNOSIS — E78.00 HYPERCHOLESTEREMIA: ICD-10-CM

## 2019-10-23 DIAGNOSIS — K59.00 CONSTIPATION, UNSPECIFIED CONSTIPATION TYPE: Primary | ICD-10-CM

## 2019-10-23 PROCEDURE — 85025 COMPLETE CBC W/AUTO DIFF WBC: CPT

## 2019-10-23 PROCEDURE — 36415 COLL VENOUS BLD VENIPUNCTURE: CPT

## 2019-10-23 RX ORDER — ATORVASTATIN CALCIUM 20 MG/1
20 TABLET, FILM COATED ORAL
Qty: 90 TAB | Refills: 3 | Status: SHIPPED | OUTPATIENT
Start: 2019-10-23 | End: 2020-09-21 | Stop reason: ALTCHOICE

## 2019-10-23 NOTE — PROGRESS NOTES
1. Have you been to the ER, urgent care clinic since your last visit? Hospitalized since your last visit? No    2. Have you seen or consulted any other health care providers outside of the [address] [place] [address] since your last visit? Include any pap smears or colon screening.  Yes    Chief Complaint   Patient presents with    Constipation     for about 1 month     Not fasting

## 2019-10-23 NOTE — PROGRESS NOTES
Pleas Aschoff is a [de-identified] y.o. female  Chief Complaint   Patient presents with    Constipation     for about 1 month     Visit Vitals  BP 93/72 (BP 1 Location: Left arm, BP Patient Position: At rest)   Pulse 68   Temp 97.6 °F (36.4 °C) (Oral)   Resp 18   Ht 5' 3\" (1.6 m)   Wt 141 lb (64 kg)   SpO2 98%   BMI 24.98 kg/m²      Health Maintenance Due   Topic Date Due    DTaP/Tdap/Td series (1 - Tdap) 02/18/1960    Shingrix Vaccine Age 50> (1 of 2) 02/18/1989    Bone Densitometry (Dexa) Screening  02/18/2004    MEDICARE YEARLY EXAM  03/16/2019    COLONOSCOPY  04/07/2019    Influenza Age 9 to Adult  08/01/2019       HPI  Constipation - Has been taking Metamucil x years, but recently not helping. Occasionally using glycerin suppositories in the past - using daily now. Staying hydrated.  would like pt to do a colonoscopy to screen for colon cancer - he is anxious about colon cancer because his mother had colon cancer, but pt doesn't want to undergo any anesthesia. Pt notes one BM that was dark in color after attending a wedding with very rich food that is unusual for her. Hx Hyperchol - needs refill on Lipitor. Doing well. Labs are UTD. Review of Systems   Constitutional: Negative for fever. Respiratory: Negative for shortness of breath. Cardiovascular: Negative for chest pain and palpitations. Gastrointestinal: Positive for constipation. Negative for abdominal pain, blood in stool, diarrhea, heartburn, nausea and vomiting. Neurological: Negative for dizziness, loss of consciousness and weakness. Physical Exam   Constitutional: She is oriented to person, place, and time. She appears well-developed and well-nourished. No distress. HENT:   Head: Normocephalic and atraumatic. Neck: Neck supple. No JVD present. No bruit bilateral carotid arteries. Cardiovascular: Normal rate, regular rhythm and normal heart sounds.    Pulmonary/Chest: Effort normal and breath sounds normal. No respiratory distress. Abdominal: Soft. Bowel sounds are normal. She exhibits no distension and no mass. There is no tenderness. There is no rebound and no guarding. No hernia. Musculoskeletal: She exhibits no edema. Neurological: She is alert and oriented to person, place, and time. Skin: Skin is warm and dry. She is not diaphoretic. Psychiatric: She has a normal mood and affect. Her behavior is normal. Judgment and thought content normal.   Nursing note and vitals reviewed. Diagnoses and all orders for this visit:    1. Constipation, unspecified constipation type  -   Stop Docolax and start docusate sodium (COLACE) 50 mg capsule; Take 1 Cap by mouth two (2) times a day for 90 days. 2. Hypercholesteremia  -     atorvastatin (LIPITOR) 20 mg tablet; Take 1 Tab by mouth nightly. 3. Screen for colon cancer  -     OCCULT BLOOD IMMUNOASSAY,DIAGNOSTIC    4.  Black stool  -     CBC WITH AUTOMATED DIFF

## 2019-10-24 ENCOUNTER — HOSPITAL ENCOUNTER (OUTPATIENT)
Dept: LAB | Age: 79
Discharge: HOME | End: 2019-10-24
Payer: MEDICARE

## 2019-10-24 LAB
BASOPHILS # BLD AUTO: 0 X10E3/UL (ref 0–0.2)
BASOPHILS NFR BLD AUTO: 1 %
EOSINOPHIL # BLD AUTO: 0.1 X10E3/UL (ref 0–0.4)
EOSINOPHIL NFR BLD AUTO: 2 %
ERYTHROCYTE [DISTWIDTH] IN BLOOD BY AUTOMATED COUNT: 12.3 % (ref 12.3–15.4)
HCT VFR BLD AUTO: 44.3 % (ref 34–46.6)
HGB BLD-MCNC: 14.5 G/DL (ref 11.1–15.9)
IMM GRANULOCYTES # BLD AUTO: 0 X10E3/UL (ref 0–0.1)
IMM GRANULOCYTES NFR BLD AUTO: 0 %
LYMPHOCYTES # BLD AUTO: 2.2 X10E3/UL (ref 0.7–3.1)
LYMPHOCYTES NFR BLD AUTO: 34 %
MCH RBC QN AUTO: 30.1 PG (ref 26.6–33)
MCHC RBC AUTO-ENTMCNC: 32.7 G/DL (ref 31.5–35.7)
MCV RBC AUTO: 92 FL (ref 79–97)
MONOCYTES # BLD AUTO: 0.5 X10E3/UL (ref 0.1–0.9)
MONOCYTES NFR BLD AUTO: 8 %
NEUTROPHILS # BLD AUTO: 3.6 X10E3/UL (ref 1.4–7)
NEUTROPHILS NFR BLD AUTO: 55 %
PLATELET # BLD AUTO: 232 X10E3/UL (ref 150–450)
RBC # BLD AUTO: 4.81 X10E6/UL (ref 3.77–5.28)
WBC # BLD AUTO: 6.5 X10E3/UL (ref 3.4–10.8)

## 2019-10-24 PROCEDURE — 82270 OCCULT BLOOD FECES: CPT

## 2019-10-28 ENCOUNTER — TELEPHONE (OUTPATIENT)
Dept: INTERNAL MEDICINE CLINIC | Age: 79
End: 2019-10-28

## 2019-10-28 DIAGNOSIS — R19.5 POSITIVE FIT (FECAL IMMUNOCHEMICAL TEST): Primary | ICD-10-CM

## 2019-10-28 LAB — HEMOCCULT STL QL IA: POSITIVE

## 2019-10-28 NOTE — TELEPHONE ENCOUNTER
Positive FIT test. Pt plans to call Dr. Nika Rose. Referral printed. Please mail this to pt and fax FIT/FOBT test results to Dr. Nika Rose. Thanks!

## 2019-11-01 PROBLEM — F02.80 PRIMARY PROGRESSIVE APHASIA (HCC): Status: ACTIVE | Noted: 2019-11-01

## 2019-11-01 PROBLEM — G31.01 PRIMARY PROGRESSIVE APHASIA (HCC): Status: ACTIVE | Noted: 2019-11-01

## 2019-12-16 ENCOUNTER — OFFICE VISIT (OUTPATIENT)
Dept: INTERNAL MEDICINE CLINIC | Age: 79
End: 2019-12-16

## 2019-12-16 VITALS
WEIGHT: 141 LBS | TEMPERATURE: 97.4 F | SYSTOLIC BLOOD PRESSURE: 126 MMHG | HEIGHT: 63 IN | RESPIRATION RATE: 20 BRPM | OXYGEN SATURATION: 97 % | HEART RATE: 63 BPM | DIASTOLIC BLOOD PRESSURE: 61 MMHG | BODY MASS INDEX: 24.98 KG/M2

## 2019-12-16 DIAGNOSIS — B35.4 TINEA CORPORIS: ICD-10-CM

## 2019-12-16 DIAGNOSIS — B35.4 TINEA CORPORIS: Primary | ICD-10-CM

## 2019-12-16 RX ORDER — CLOTRIMAZOLE AND BETAMETHASONE DIPROPIONATE 10; .64 MG/G; MG/G
CREAM TOPICAL 2 TIMES DAILY
Qty: 30 G | Refills: 1 | Status: SHIPPED | OUTPATIENT
Start: 2019-12-16 | End: 2019-12-16 | Stop reason: SDUPTHER

## 2019-12-16 NOTE — PROGRESS NOTES
Chief Complaint   Patient presents with    Skin Problem     she is a [de-identified]y.o. year old female who presents for evaluation. Patient presents the office today for evaluation of a rash. She reports that she had moles removed approximately 3 years ago. She reports she is not sure if this is related to it or not however the spots she has now are very itchy. She reports that her  has a serious rash and is being treated. She states the rash is located on the front the back and her sides. She has not tried any medication on the areas. Reviewed PmHx, RxHx, FmHx, SocHx, AllgHx and updated and dated in the chart. Review of Systems - negative except as listed above    Objective:     Vitals:    12/16/19 1311   BP: 126/61   Pulse: 63   Resp: 20   Temp: 97.4 °F (36.3 °C)   TempSrc: Oral   SpO2: 97%   Weight: 141 lb (64 kg)   Height: 5' 3\" (1.6 m)     Physical Examination: General appearance - alert, well appearing, and in no distress  Skin -multiple well demarcated circular type rash of various sizes noted on abdomen flank and back. Patient has a central rash that is approximately 3-1/2 cm in size of her back. This area is well demarcated and circular in appearance as well with some mild flaking and erythema. The area does not appear to be infected. Assessment/ Plan:   Diagnoses and all orders for this visit:    1. Tinea corporis  -     clotrimazole-betamethasone (LOTRISONE) topical cream; Apply  to affected area two (2) times a day. I explained to the patient that the area appears to be tinea. I would like for her to use the cream as prescribed. Advised her to please follow-up with Dr. Seymour Coulter if the area does not improve. Explained to her that she may have caught this from her . I have discussed the diagnosis with the patient and the intended plan as seen in the above orders. The patient has received an after-visit summary and questions were answered concerning future plans. Medication Side Effects and Warnings were discussed with patient: yes  Patient Labs were reviewed and or requested: na  Patient Past Records were reviewed and or requested  yes    Aliza Harris PA-C

## 2019-12-16 NOTE — PROGRESS NOTES
Patient having severe itching lower abdomen, around to the right and back.  Red blotches on the right hip area,

## 2019-12-17 RX ORDER — CLOTRIMAZOLE AND BETAMETHASONE DIPROPIONATE 10; .64 MG/G; MG/G
CREAM TOPICAL 2 TIMES DAILY
Qty: 30 G | Refills: 1 | Status: SHIPPED | OUTPATIENT
Start: 2019-12-17 | End: 2022-05-05

## 2020-06-22 ENCOUNTER — HOSPITAL ENCOUNTER (OUTPATIENT)
Dept: LAB | Age: 80
Discharge: HOME | End: 2020-06-22
Payer: MEDICARE

## 2020-06-22 ENCOUNTER — VIRTUAL VISIT (OUTPATIENT)
Dept: INTERNAL MEDICINE CLINIC | Age: 80
End: 2020-06-22

## 2020-06-22 DIAGNOSIS — M79.10 MYALGIA: ICD-10-CM

## 2020-06-22 DIAGNOSIS — E02 SUBCLINICAL IODINE-DEFICIENCY HYPOTHYROIDISM: ICD-10-CM

## 2020-06-22 DIAGNOSIS — E78.00 HYPERCHOLESTEREMIA: ICD-10-CM

## 2020-06-22 DIAGNOSIS — Z00.00 MEDICARE ANNUAL WELLNESS VISIT, SUBSEQUENT: Primary | ICD-10-CM

## 2020-06-22 DIAGNOSIS — F02.80 LATE ONSET ALZHEIMER'S DISEASE WITHOUT BEHAVIORAL DISTURBANCE (HCC): ICD-10-CM

## 2020-06-22 DIAGNOSIS — G30.1 LATE ONSET ALZHEIMER'S DISEASE WITHOUT BEHAVIORAL DISTURBANCE (HCC): ICD-10-CM

## 2020-06-22 PROBLEM — G31.01 PRIMARY PROGRESSIVE APHASIA (HCC): Status: RESOLVED | Noted: 2019-11-01 | Resolved: 2020-06-22

## 2020-06-22 PROCEDURE — 84443 ASSAY THYROID STIM HORMONE: CPT

## 2020-06-22 PROCEDURE — 85025 COMPLETE CBC W/AUTO DIFF WBC: CPT

## 2020-06-22 PROCEDURE — 82550 ASSAY OF CK (CPK): CPT

## 2020-06-22 PROCEDURE — 80061 LIPID PANEL: CPT

## 2020-06-22 PROCEDURE — 80053 COMPREHEN METABOLIC PANEL: CPT

## 2020-06-22 RX ORDER — ACETAMINOPHEN, DIPHENHYDRAMINE HCL, PHENYLEPHRINE HCL 325; 25; 5 MG/1; MG/1; MG/1
10 TABLET ORAL
COMMUNITY
End: 2022-06-16 | Stop reason: ALTCHOICE

## 2020-06-22 RX ORDER — ACETAMINOPHEN 500 MG
2000 TABLET ORAL 2 TIMES DAILY
COMMUNITY

## 2020-06-22 RX ORDER — TRIAMCINOLONE ACETONIDE 1 MG/G
CREAM TOPICAL
COMMUNITY
Start: 2020-05-21 | End: 2020-11-12 | Stop reason: ALTCHOICE

## 2020-06-22 RX ORDER — CETIRIZINE HCL 10 MG
10 TABLET ORAL
COMMUNITY
End: 2020-09-21 | Stop reason: ALTCHOICE

## 2020-06-22 NOTE — PATIENT INSTRUCTIONS
Medicare Wellness Visit, Female The best way to live healthy is to have a lifestyle where you eat a well-balanced diet, exercise regularly, limit alcohol use, and quit all forms of tobacco/nicotine, if applicable. Regular preventive services are another way to keep healthy. Preventive services (vaccines, screening tests, monitoring & exams) can help personalize your care plan, which helps you manage your own care. Screening tests can find health problems at the earliest stages, when they are easiest to treat. [facility] follows the current, evidence-based guidelines published by the [facility] ([facility]) when recommending preventive services for our patients. Because we follow these guidelines, sometimes recommendations change over time as research supports it. (For example, mammograms used to be recommended annually. Even though Medicare will still pay for an annual mammogram, the newer guidelines recommend a mammogram every two years for women of average risk). Of course, you and your doctor may decide to screen more often for some diseases, based on your risk and your co-morbidities (chronic disease you are already diagnosed with). Preventive services for you include: - Medicare offers their members a free annual wellness visit, which is time for you and your primary care provider to discuss and plan for your preventive service needs. Take advantage of this benefit every year! 
-All adults over the age of 72 should receive the recommended pneumonia vaccines. Current USPSTF guidelines recommend a series of two vaccines for the best pneumonia protection.  
-All adults should have a flu vaccine yearly and a tetanus vaccine every 10 years.  
-All adults age 48 and older should receive the shingles vaccines (series of two vaccines). -All adults age 38-68 who are overweight should have a diabetes screening test once every three years. -All adults born between [birth year] and 1966 should be screened once for Hepatitis C. 
-Other screening tests and preventive services for persons with diabetes include: an eye exam to screen for diabetic retinopathy, a kidney function test, a foot exam, and stricter control over your cholesterol.  
-Cardiovascular screening for adults with routine risk involves an electrocardiogram (ECG) at intervals determined by your doctor.  
-Colorectal cancer screenings should be done for adults age 54-65 with no increased risk factors for colorectal cancer. There are a number of acceptable methods of screening for this type of cancer. Each test has its own benefits and drawbacks. Discuss with your doctor what is most appropriate for you during your annual wellness visit. The different tests include: colonoscopy (considered the best screening method), a fecal occult blood test, a fecal DNA test, and sigmoidoscopy. 
 
-A bone mass density test is recommended when a woman turns 65 to screen for osteoporosis. This test is only recommended one time, as a screening. Some providers will use this same test as a disease monitoring tool if you already have osteoporosis. -Breast cancer screenings are recommended every other year for women of normal risk, age 54-69. 
-Cervical cancer screenings for women over age 72 are only recommended with certain risk factors. Here is a list of your current Health Maintenance items (your personalized list of preventive services) with a due date: 
Health Maintenance Due Topic Date Due  
 DTaP/Tdap/Td  (1 - Tdap) 02/18/1960  Shingles Vaccine (1 of 2) 02/18/1989  Bone Mineral Density   02/18/2004 Peg Prabhakar Annual Well Visit  03/16/2019  Glaucoma Screening   03/15/2020

## 2020-06-22 NOTE — PROGRESS NOTES
This is the Subsequent Medicare Annual Wellness Exam, performed 12 months or more after the Initial AWV or the last Subsequent AWV    Consent: Esther Jhaveri, who was seen by synchronous (real-time) audio-video technology, and/or her healthcare decision maker, is aware that this patient-initiated, Telehealth encounter on 6/22/2020 is a billable service. While AWVs are fully covered by Medicare, any services rendered on this date that are not included in an AWV are subject to additional billing, with coverage as determined by her insurance carrier. She is aware that she may receive a bill for any such additional services and has provided verbal consent to proceed: Yes. I have reviewed the patient's medical history in detail and updated the computerized patient record.      History     Patient Active Problem List   Diagnosis Code    Hypercholesteremia E78.00    Hypothyroid E03.9    Allergic rhinitis J30.9    Elevated blood pressure reading without diagnosis of hypertension R03.0    Rosacea L71.9    Polycythemia, secondary D75.1    Subclavian steal syndrome G45.8    Spinal stenosis at L4-L5 level M48.061     Past Medical History:   Diagnosis Date    Arthritis     Chronic pain 1998    feet pain    Hypercholesterolemia     Hypertension     borderline, no medication    Other ill-defined conditions(799.89)     peripheral artery disease in bilateral feet    Other ill-defined conditions(799.89)     incontinant of urine, chest cold dr appt today    Rosacea 4/7/2011    Thyroid disease     Tobacco abuse, episodic 4/7/2011      Past Surgical History:   Procedure Laterality Date    BREAST SURGERY PROCEDURE UNLISTED      right lumpectomy    ENDOSCOPY, COLON, DIAGNOSTIC      HX APPENDECTOMY      HX ORTHOPAEDIC      podiatry,arthroscopy on knees,mortons neuroma removed    HX ORTHOPAEDIC      knee repl;acement bilat 2017 2018    HX UROLOGICAL      kidney stone removed     Current Outpatient Medications Medication Sig Dispense Refill    triamcinolone acetonide (KENALOG) 0.1 % topical cream APPLY CREAM TWO TIMES DAILY TO AREAS OF RASH X2 WEEKS, BREAK FOR 1 WEEK THEN AS NEEDED      melatonin 10 mg tab Take 10 mg by mouth nightly.  cetirizine (ZYRTEC) 10 mg tablet Take 10 mg by mouth nightly.  OTHER Take 1 Each by mouth daily. zeremin for dementia      cholecalciferol (VITAMIN D3) (2,000 UNITS /50 MCG) cap capsule Take 2,000 Units by mouth two (2) times a day.  levothyroxine (SYNTHROID) 75 mcg tablet TAKE 1 TABLET BY MOUTH EVERY DAY BEFORE BREAKFAST 90 Tab 1    clotrimazole-betamethasone (LOTRISONE) topical cream Apply  to affected area two (2) times a day. 30 g 1    acetaminophen (TYLENOL EXTRA STRENGTH) 500 mg tablet Take 500 mg by mouth daily.  NASONEX 50 mcg/actuation nasal spray USE 2 SPRAYS BY BOTH NOSTRILS ROUTE DAILY. (Patient taking differently: 2 Sprays by Both Nostrils route as needed.) 1 Container 2    atorvastatin (LIPITOR) 20 mg tablet Take 1 Tab by mouth nightly.  90 Tab 3     Allergies   Allergen Reactions    Other Plant, Animal, Environmental Other (comments)     Seasonal allergies/ mold     Penicillins Hives    Sulfa (Sulfonamide Antibiotics) Itching and Drowsiness       Family History   Problem Relation Age of Onset    Heart Disease Brother     Hypertension Brother     Elevated Lipids Brother     Elevated Lipids Mother     Elevated Lipids Father     Breast Cancer Maternal Aunt 54    Breast Cancer Maternal Aunt         age unknown    Breast Cancer Maternal Aunt         age unknown     Social History     Tobacco Use    Smoking status: Former Smoker     Packs/day: 0.50     Years: 20.00     Pack years: 10.00     Types: Cigarettes     Last attempt to quit: 6/10/2013     Years since quittin.0    Smokeless tobacco: Never Used    Tobacco comment: 3-4 cigarettes monthly   Substance Use Topics    Alcohol use: Yes     Comment: monthly       Depression Risk Factor Screening:     3 most recent PHQ Screens 6/22/2020   Little interest or pleasure in doing things Not at all   Feeling down, depressed, irritable, or hopeless Not at all   Total Score PHQ 2 0       Alcohol Risk Factor Screening:   Do you average 1 drink per night or more than 7 drinks a week: On any one occasion in the past three months have you have had more than 3 drinks containing alcohol:        Functional Ability and Level of Safety:   Hearing: The patient needs further evaluation. Activities of Daily Living: The home contains: handrails and grab bars  Patient needs help with:  transportation, shopping, laundry, housework, managing medications and managing money     Ambulation: with mild difficulty     Fall Risk:  Fall Risk Assessment, last 12 mths 10/23/2019   Able to walk? Yes   Fall in past 12 months? No   Fall with injury? -   Number of falls in past 12 months -   Fall Risk Score -     Abuse Screen:  Patient is not abused       Cognitive Screening   Has your family/caregiver stated any concerns about your memory: yes - dementia    Cognitive Screening: Abnormal - Verbal Fluency Test    Patient Care Team   Patient Care Team:  Grace Rinne, MD as PCP - General  Grace Rinne, MD as PCP - [facility]    Assessment/Plan   Education and counseling provided:  Are appropriate based on today's review and evaluation    Diagnoses and all orders for this visit:    1. Medicare annual wellness visit, subsequent        Health Maintenance Due   Topic Date Due    DTaP/Tdap/Td series (1 - Tdap) 02/18/1960    Shingrix Vaccine Age 50> (1 of 2) 02/18/1989    Bone Densitometry (Dexa) Screening  02/18/2004    Medicare Yearly Exam  03/16/2019    GLAUCOMA SCREENING Q2Y  03/15/2020       John Lipoma Leslie Madison is a 79 y.o. female who was evaluated by an audio-video encounter for concerns as above. Patient identification was verified prior to start of the visit. A caregiver was present when appropriate.  Due to this being a TeleHealth encounter (During TDOHE-27 public health emergency), evaluation of the following organ systems was limited: Vitals/Constitutional/EENT/Resp/CV/GI//MS/Neuro/Skin/Heme-Lymph-Imm. Pursuant to the emergency declaration under the [address] [place] [address] [facility] authority and the [employer] Act, this Virtual Visit was conducted, with patient's (and/or legal guardian's) consent, to reduce the patient's risk of exposure to COVID-19 and provide necessary medical care. Services were provided through a synchronous discussion virtually to substitute for in-person clinic visit. I was  At office The patient was at home. Suzanne Bruno MD       Subjective:  Ms. Elda Skiff has had increased myalgias, more arthralgias. She has had bilateral knee replacements done by [facility], the last one 18-20 months ago. She has subsequently been diagnosed with dementia. She tried working in her garden for a while and developed increasing pain in her joints. They tried her on some topical Lidocaine, but she broke out. They have not tried her on a topical NSAID. Her  has given her 500 mg, one a day, of Tylenol, but no more. She has an appointment to see Dr. Glenys Tena, but it is down the road. There has not been any pain, but her bilateral knees are stiff. She denies real muscle pain, no myopathy, but some arthropathy, some pain in other joints. She has been diagnosed with dementia, has seen [facility]. She is on no meds. She is taking melatonin for sleep and she is using an internet based memory enhancer. She takes Metamucil daily. Her bowels have been okay with it and she has been getting around okay. They took her off her statin about a month ago, but it has made no difference in her arthralgias. She wanted to get repeat lipids and repeat her thyroid function.     BP Readings from Last 3 Encounters: 12/16/19 126/61   10/23/19 93/72   07/29/19 145/72     Alert oriented     . Hard of hearing    Subjective: This is a virtual visit of an 79-year-old female, who has been seen for dementia and is being followed by [facility]. She has a history of increasing knee pain recently. She did a lot of gardening. She is status post bilateral knee replacement. She has made an appointment to see orthopedics, but that is not till down the road. She has had a lot of arthralgias, no real myalgias. Her son, who is a radiologist, told her to stop her Lipitor to see if it made a difference. She has been off of it for a month, the patient says no, it is not any different, she still has a lot of joint pain. She has been taking one Tylenol a day. Patient Active Problem List    Diagnosis    Spinal stenosis at L4-L5 level    Subclavian steal syndrome    Polycythemia, secondary    Elevated blood pressure reading without diagnosis of hypertension    Rosacea    Allergic rhinitis    Hypercholesteremia    Hypothyroid     1. Medicare annual wellness visit, subsequent      2. Hypercholesteremia  Off statin  - CBC WITH AUTOMATED DIFF; Future  - LIPID PANEL; Future  - METABOLIC PANEL, COMPREHENSIVE; Future    3. Subclinical iodine-deficiency hypothyroidism  Taking faithfully  - TSH 3RD GENERATION; Future    4. Myalgia  Doubt from statin   - CK;  Future  Dementia reviewed not ptogressing now

## 2020-06-23 LAB
ALBUMIN SERPL-MCNC: 4 G/DL (ref 3.6–4.6)
ALBUMIN/GLOB SERPL: 1.8 {RATIO} (ref 1.2–2.2)
ALP SERPL-CCNC: 86 IU/L (ref 39–117)
ALT SERPL-CCNC: 12 IU/L (ref 0–32)
AST SERPL-CCNC: 24 IU/L (ref 0–40)
BASOPHILS # BLD AUTO: 0 X10E3/UL (ref 0–0.2)
BASOPHILS NFR BLD AUTO: 0 %
BILIRUB SERPL-MCNC: 0.6 MG/DL (ref 0–1.2)
BUN SERPL-MCNC: 21 MG/DL (ref 8–27)
BUN/CREAT SERPL: 28 (ref 12–28)
CALCIUM SERPL-MCNC: 9.6 MG/DL (ref 8.7–10.3)
CHLORIDE SERPL-SCNC: 104 MMOL/L (ref 96–106)
CHOLEST SERPL-MCNC: 203 MG/DL (ref 100–199)
CK SERPL-CCNC: 64 U/L (ref 26–161)
CO2 SERPL-SCNC: 23 MMOL/L (ref 20–29)
CREAT SERPL-MCNC: 0.76 MG/DL (ref 0.57–1)
EOSINOPHIL # BLD AUTO: 0.2 X10E3/UL (ref 0–0.4)
EOSINOPHIL NFR BLD AUTO: 3 %
ERYTHROCYTE [DISTWIDTH] IN BLOOD BY AUTOMATED COUNT: 12.4 % (ref 11.7–15.4)
GLOBULIN SER CALC-MCNC: 2.2 G/DL (ref 1.5–4.5)
GLUCOSE SERPL-MCNC: 78 MG/DL (ref 65–99)
HCT VFR BLD AUTO: 44.6 % (ref 34–46.6)
HDLC SERPL-MCNC: 58 MG/DL
HGB BLD-MCNC: 14.6 G/DL (ref 11.1–15.9)
IMM GRANULOCYTES # BLD AUTO: 0 X10E3/UL (ref 0–0.1)
IMM GRANULOCYTES NFR BLD AUTO: 0 %
INTERPRETATION, 910389: NORMAL
LDLC SERPL CALC-MCNC: 129 MG/DL (ref 0–99)
LYMPHOCYTES # BLD AUTO: 2.5 X10E3/UL (ref 0.7–3.1)
LYMPHOCYTES NFR BLD AUTO: 35 %
MCH RBC QN AUTO: 30.5 PG (ref 26.6–33)
MCHC RBC AUTO-ENTMCNC: 32.7 G/DL (ref 31.5–35.7)
MCV RBC AUTO: 93 FL (ref 79–97)
MONOCYTES # BLD AUTO: 0.5 X10E3/UL (ref 0.1–0.9)
MONOCYTES NFR BLD AUTO: 7 %
NEUTROPHILS # BLD AUTO: 3.9 X10E3/UL (ref 1.4–7)
NEUTROPHILS NFR BLD AUTO: 55 %
PLATELET # BLD AUTO: 234 X10E3/UL (ref 150–450)
POTASSIUM SERPL-SCNC: 4.5 MMOL/L (ref 3.5–5.2)
PROT SERPL-MCNC: 6.2 G/DL (ref 6–8.5)
RBC # BLD AUTO: 4.78 X10E6/UL (ref 3.77–5.28)
SODIUM SERPL-SCNC: 142 MMOL/L (ref 134–144)
TRIGL SERPL-MCNC: 79 MG/DL (ref 0–149)
TSH SERPL DL<=0.005 MIU/L-ACNC: 0.66 UIU/ML (ref 0.45–4.5)
VLDLC SERPL CALC-MCNC: 16 MG/DL (ref 5–40)
WBC # BLD AUTO: 7.2 X10E3/UL (ref 3.4–10.8)

## 2020-06-24 NOTE — PROGRESS NOTES
Writer contacted patient to inform of lab results and instruction per Dr. Miroslava Chakraborty, patient verbalized understanding.

## 2020-09-21 ENCOUNTER — OFFICE VISIT (OUTPATIENT)
Dept: INTERNAL MEDICINE CLINIC | Age: 80
End: 2020-09-21
Payer: MEDICARE

## 2020-09-21 VITALS
SYSTOLIC BLOOD PRESSURE: 129 MMHG | WEIGHT: 129.2 LBS | HEART RATE: 68 BPM | HEIGHT: 63 IN | RESPIRATION RATE: 18 BRPM | DIASTOLIC BLOOD PRESSURE: 72 MMHG | BODY MASS INDEX: 22.89 KG/M2 | OXYGEN SATURATION: 98 % | TEMPERATURE: 95 F

## 2020-09-21 DIAGNOSIS — F34.1 DYSTHYMIA: ICD-10-CM

## 2020-09-21 DIAGNOSIS — E78.00 HYPERCHOLESTEREMIA: ICD-10-CM

## 2020-09-21 DIAGNOSIS — F02.80 LATE ONSET ALZHEIMER'S DISEASE WITHOUT BEHAVIORAL DISTURBANCE (HCC): ICD-10-CM

## 2020-09-21 DIAGNOSIS — Z78.0 POSTMENOPAUSAL STATE: Primary | ICD-10-CM

## 2020-09-21 DIAGNOSIS — Z23 NEEDS FLU SHOT: ICD-10-CM

## 2020-09-21 DIAGNOSIS — F33.0 DEPRESSION, MAJOR, RECURRENT, MILD (HCC): ICD-10-CM

## 2020-09-21 DIAGNOSIS — G30.1 LATE ONSET ALZHEIMER'S DISEASE WITHOUT BEHAVIORAL DISTURBANCE (HCC): ICD-10-CM

## 2020-09-21 PROCEDURE — G8420 CALC BMI NORM PARAMETERS: HCPCS | Performed by: INTERNAL MEDICINE

## 2020-09-21 PROCEDURE — G8427 DOCREV CUR MEDS BY ELIG CLIN: HCPCS | Performed by: INTERNAL MEDICINE

## 2020-09-21 PROCEDURE — G0463 HOSPITAL OUTPT CLINIC VISIT: HCPCS | Performed by: INTERNAL MEDICINE

## 2020-09-21 PROCEDURE — G8536 NO DOC ELDER MAL SCRN: HCPCS | Performed by: INTERNAL MEDICINE

## 2020-09-21 PROCEDURE — G8431 POS CLIN DEPRES SCRN F/U DOC: HCPCS | Performed by: INTERNAL MEDICINE

## 2020-09-21 PROCEDURE — 1101F PT FALLS ASSESS-DOCD LE1/YR: CPT | Performed by: INTERNAL MEDICINE

## 2020-09-21 PROCEDURE — G8400 PT W/DXA NO RESULTS DOC: HCPCS | Performed by: INTERNAL MEDICINE

## 2020-09-21 PROCEDURE — 1090F PRES/ABSN URINE INCON ASSESS: CPT | Performed by: INTERNAL MEDICINE

## 2020-09-21 PROCEDURE — 99214 OFFICE O/P EST MOD 30 MIN: CPT | Performed by: INTERNAL MEDICINE

## 2020-09-21 RX ORDER — CITALOPRAM 10 MG/1
10 TABLET ORAL DAILY
Qty: 90 TAB | Refills: 1 | Status: SHIPPED | OUTPATIENT
Start: 2020-09-21 | End: 2021-08-03

## 2020-09-21 NOTE — PROGRESS NOTES
Chief Complaint   Patient presents with    Physical    Immunization/Injection     high dose flu shot     Medication Refill     90 day refills       1. Have you been to the ER, urgent care clinic since your last visit? Hospitalized since your last visit? No    2. Have you seen or consulted any other health care providers outside of the [address] [place] [address] since your last visit? Include any pap smears or colon screening.  No

## 2020-09-21 NOTE — PROGRESS NOTES
Subjective: This is a review of her status. She had a Medicare wellness done earlier this year. She has had more and more depression since being in lockdown at home. She is followed by Dr. Timur Ruiz from [facility], has dementia. He is not in favor of giving her any medications and she is happy with that. Her mood has been sad, depressed, but not suicidal.  She is stressed and anxious over the fact of things she cannot do that she used to do. She has a history of dyslipidemia, never had any coronary disease. Her son, who is a radiologist, told her maybe she should \"get off that stuff\" as he is really not an expert. She thinks she was off of it when she had her blood work in June. Her blood work in June showed her total cholesterol to be just somewhat elevated. She says she really does not note any change in the way she feels on or off the statin, has no heart disease. Taking a statin was totally for primary prevention. She has not had blood pressure troubles, just hypothyroidism and labs on that were followed and done in June, which were normal.      Physical Examination:  Her mood was depressed. She had a regular heart rhythm. She had clear lungs, soft abdomen. She moved slowly, has a bad knee and limps a little bit. She was mildly confused.     Patient Active Problem List    Diagnosis    Depression, major, recurrent, mild ([address]    Late onset Alzheimer's disease without behavioral disturbance ([address]    Spinal stenosis at L4-L5 level    Subclavian steal syndrome    Polycythemia, secondary    Elevated blood pressure reading without diagnosis of hypertension    Rosacea    Allergic rhinitis    Hypercholesteremia    Hypothyroid     Vitals:    09/21/20 1034   BP: 129/72   Pulse: 68   Resp: 18   Temp: (!) 95 °F (35 °C)   TempSrc: Temporal   SpO2: 98%   Weight: 129 lb 3.2 oz (58.6 kg)   Height: 5' 3\" (1.6 m)     Lab Results   Component Value Date/Time    TSH 0.656 06/22/2020 12:00 AM Lab Results   Component Value Date/Time    Cholesterol, total 203 (H) 06/22/2020 12:00 AM    HDL Cholesterol 58 06/22/2020 12:00 AM    LDL, calculated 129 (H) 06/22/2020 12:00 AM    VLDL, calculated 16 06/22/2020 12:00 AM    Triglyceride 79 06/22/2020 12:00 AM    CHOL/HDL Ratio 2.5 09/13/2010 10:10 AM     1. Postmenopausal state  Due reviewed reasoning  - DEXA BONE DENSITY STUDY AXIAL; Future    2. Needs flu shot    - FLU (FLUAD QUAD INFLUENZA VACCINE,QUAD,ADJUVANTED)    3. Late onset Alzheimer's disease without behavioral disturbance (HCC)  No rx    4. Hypercholesteremia  Off meds now    5. Dysthymia  Start antidepressant    6. Depression, major, recurrent, mild ([address]  Start meds see 60 days  - citalopram (CELEXA) 10 mg tablet; Take 1 Tab by mouth daily. Dispense: 90 Tab;  Refill: 1

## 2020-11-02 ENCOUNTER — TELEPHONE (OUTPATIENT)
Dept: INTERNAL MEDICINE CLINIC | Age: 80
End: 2020-11-02

## 2020-11-02 RX ORDER — ATORVASTATIN CALCIUM 20 MG/1
20 TABLET, FILM COATED ORAL DAILY
Qty: 90 TAB | Refills: 3 | Status: SHIPPED | OUTPATIENT
Start: 2020-11-02 | End: 2021-08-03 | Stop reason: SINTOL

## 2020-11-02 NOTE — TELEPHONE ENCOUNTER
Patient called requesting lab results from September.  says she stopped taking Atorvastatin. Wanted to check the range.

## 2020-11-12 ENCOUNTER — OFFICE VISIT (OUTPATIENT)
Dept: INTERNAL MEDICINE CLINIC | Age: 80
End: 2020-11-12
Payer: MEDICARE

## 2020-11-12 VITALS
OXYGEN SATURATION: 100 % | BODY MASS INDEX: 22.82 KG/M2 | SYSTOLIC BLOOD PRESSURE: 128 MMHG | TEMPERATURE: 98 F | HEART RATE: 64 BPM | HEIGHT: 63 IN | WEIGHT: 128.8 LBS | DIASTOLIC BLOOD PRESSURE: 85 MMHG | RESPIRATION RATE: 20 BRPM

## 2020-11-12 DIAGNOSIS — K43.9 VENTRAL HERNIA WITHOUT OBSTRUCTION OR GANGRENE: Primary | ICD-10-CM

## 2020-11-12 PROCEDURE — 99213 OFFICE O/P EST LOW 20 MIN: CPT | Performed by: INTERNAL MEDICINE

## 2020-11-12 PROCEDURE — G0463 HOSPITAL OUTPT CLINIC VISIT: HCPCS | Performed by: INTERNAL MEDICINE

## 2020-11-12 PROCEDURE — G8427 DOCREV CUR MEDS BY ELIG CLIN: HCPCS | Performed by: INTERNAL MEDICINE

## 2020-11-12 PROCEDURE — G8420 CALC BMI NORM PARAMETERS: HCPCS | Performed by: INTERNAL MEDICINE

## 2020-11-12 PROCEDURE — G9717 DOC PT DX DEP/BP F/U NT REQ: HCPCS | Performed by: INTERNAL MEDICINE

## 2020-11-12 PROCEDURE — G8400 PT W/DXA NO RESULTS DOC: HCPCS | Performed by: INTERNAL MEDICINE

## 2020-11-12 PROCEDURE — G8536 NO DOC ELDER MAL SCRN: HCPCS | Performed by: INTERNAL MEDICINE

## 2020-11-12 PROCEDURE — 1090F PRES/ABSN URINE INCON ASSESS: CPT | Performed by: INTERNAL MEDICINE

## 2020-11-12 PROCEDURE — 1101F PT FALLS ASSESS-DOCD LE1/YR: CPT | Performed by: INTERNAL MEDICINE

## 2020-11-12 NOTE — PROGRESS NOTES
Chief Complaint   Patient presents with    Hernia (Non Specific)     below navel, comes and goes     Past Surgical History:   Procedure Laterality Date    BREAST SURGERY PROCEDURE UNLISTED      right lumpectomy    ENDOSCOPY, COLON, DIAGNOSTIC      HX APPENDECTOMY      HX GYN      hysterectomy    HX ORTHOPAEDIC      podiatry,arthroscopy on knees,mortons neuroma removed    HX ORTHOPAEDIC      knee repl;acement bilat 2017 2018    HX UROLOGICAL      kidney stone removed     Painful swelling  Right paraventral hernia seen when she sits up easily reducible    Recent new pain      Vitals:    11/12/20 1453   BP: 128/85   Pulse: 64   Resp: 20   Temp: 98 °F (36.7 °C)   TempSrc: Oral   SpO2: 100%   Weight: 128 lb 12.8 oz (58.4 kg)   Height: 5' 3\" (1.6 m)     Abdominal exam: soft, nontender, nondistended, no masses or organomegaly  HERNIA EXAM: right incisional hernia easily reducible. Diagnoses and all orders for this visit:    1.  Ventral hernia without obstruction or gangrene  -     REFERRAL TO GENERAL SURGERY      Can try a binder but not long term answer

## 2020-11-12 NOTE — PROGRESS NOTES
Chief Complaint   Patient presents with    Hernia (Non Specific)     below navel, comes and goes       1. Have you been to the ER, urgent care clinic since your last visit? Hospitalized since your last visit? No    2. Have you seen or consulted any other health care providers outside of the [address] [place] [address] since your last visit? Include any pap smears or colon screening.  No

## 2021-01-17 RX ORDER — LEVOTHYROXINE SODIUM 75 UG/1
TABLET ORAL
Qty: 90 TAB | Refills: 1 | Status: SHIPPED | OUTPATIENT
Start: 2021-01-17 | End: 2021-04-30 | Stop reason: SDUPTHER

## 2021-04-30 NOTE — TELEPHONE ENCOUNTER
Spouse       Best contact number(s): [phone]       Name of medication and dosage if known: Levothyroxine 75mcg       Is patient out of this medication (yes/no):  No, 1 week left       Pharmacy name: [facility] and [address] listed in chart? (yes/no): Yes   Pharmacy phone number: [phone]       [facility]

## 2021-05-01 RX ORDER — LEVOTHYROXINE SODIUM 75 UG/1
75 TABLET ORAL
Qty: 90 TAB | Refills: 1 | Status: SHIPPED | OUTPATIENT
Start: 2021-05-01 | End: 2022-02-14

## 2021-08-03 ENCOUNTER — OFFICE VISIT (OUTPATIENT)
Dept: INTERNAL MEDICINE CLINIC | Age: 81
End: 2021-08-03
Payer: MEDICARE

## 2021-08-03 VITALS
SYSTOLIC BLOOD PRESSURE: 118 MMHG | TEMPERATURE: 98.1 F | BODY MASS INDEX: 21.97 KG/M2 | HEIGHT: 63 IN | DIASTOLIC BLOOD PRESSURE: 82 MMHG | OXYGEN SATURATION: 99 % | RESPIRATION RATE: 12 BRPM | HEART RATE: 64 BPM | WEIGHT: 124 LBS

## 2021-08-03 DIAGNOSIS — Z00.00 MEDICARE ANNUAL WELLNESS VISIT, SUBSEQUENT: ICD-10-CM

## 2021-08-03 DIAGNOSIS — R35.0 URINARY FREQUENCY: Primary | ICD-10-CM

## 2021-08-03 LAB
BILIRUB UR QL STRIP: NEGATIVE
GLUCOSE UR-MCNC: NEGATIVE MG/DL
KETONES P FAST UR STRIP-MCNC: NEGATIVE MG/DL
PH UR STRIP: 6 [PH] (ref 4.6–8)
PROT UR QL STRIP: NEGATIVE
SP GR UR STRIP: 1.02 (ref 1–1.03)
UA UROBILINOGEN AMB POC: NORMAL (ref 0.2–1)
URINALYSIS CLARITY POC: CLEAR
URINALYSIS COLOR POC: NORMAL
URINE BLOOD POC: NEGATIVE
URINE LEUKOCYTES POC: NEGATIVE
URINE NITRITES POC: NEGATIVE

## 2021-08-03 PROCEDURE — 99213 OFFICE O/P EST LOW 20 MIN: CPT | Performed by: INTERNAL MEDICINE

## 2021-08-03 PROCEDURE — G0439 PPPS, SUBSEQ VISIT: HCPCS | Performed by: INTERNAL MEDICINE

## 2021-08-03 PROCEDURE — 81003 URINALYSIS AUTO W/O SCOPE: CPT | Performed by: INTERNAL MEDICINE

## 2021-08-03 RX ORDER — TOLTERODINE 2 MG/1
2 CAPSULE, EXTENDED RELEASE ORAL DAILY
Qty: 30 CAPSULE | Refills: 2 | Status: SHIPPED | OUTPATIENT
Start: 2021-08-03 | End: 2021-08-03 | Stop reason: SDUPTHER

## 2021-08-03 RX ORDER — TOLTERODINE 2 MG/1
2 CAPSULE, EXTENDED RELEASE ORAL DAILY
Qty: 30 CAPSULE | Refills: 2 | Status: SHIPPED | OUTPATIENT
Start: 2021-08-03 | End: 2021-10-18 | Stop reason: SDUPTHER

## 2021-08-03 NOTE — PROGRESS NOTES
Memo Rosales is a 79 y.o. female    Chief Complaint   Patient presents with    Insomnia     worse in last 2 weeks     Urinary Frequency     x 3 months     Dementia       Health Maintenance Due   Topic Date Due    COVID-19 Vaccine (1) Never done    DTaP/Tdap/Td series (1 - Tdap) Never done    Shingrix Vaccine Age 50> (1 of 2) Never done    Bone Densitometry (Dexa) Screening  Never done    Lipid Screen  06/22/2021    Medicare Yearly Exam  06/23/2021       Visit Vitals  /82 (BP 1 Location: Right upper arm, BP Patient Position: Sitting)   Pulse 64   Temp 98.1 °F (36.7 °C) (Temporal)   Resp 12   Ht 5' 3\" (1.6 m)   Wt 124 lb (56.2 kg)   SpO2 99%   BMI 21.97 kg/m²       3 most recent PHQ Screens 9/21/2020   Little interest or pleasure in doing things Not at all   Feeling down, depressed, irritable, or hopeless Nearly every day   Total Score PHQ 2 3   Trouble falling or staying asleep, or sleeping too much Nearly every day   Feeling tired or having little energy More than half the days   Poor appetite, weight loss, or overeating Not at all   Feeling bad about yourself - or that you are a failure or have let yourself or your family down Several days   Trouble concentrating on things such as school, work, reading, or watching TV More than half the days   Moving or speaking so slowly that other people could have noticed; or the opposite being so fidgety that others notice Not at all   Thoughts of being better off dead, or hurting yourself in some way Not at all   PHQ 9 Score 11       Fall Risk Assessment, last 12 mths 8/3/2021   Able to walk? Yes   Fall in past 12 months? 0   Do you feel unsteady? 0   Are you worried about falling 0   Number of falls in past 12 months -   Fall with injury? -       Abuse Screening Questionnaire 2/20/2019   Do you ever feel afraid of your partner? N   Are you in a relationship with someone who physically or mentally threatens you? N   Is it safe for you to go home?  Y         1. Have you been to the ER, urgent care clinic since your last visit? Hospitalized since your last visit?no    2. Have you seen or consulted any other health care providers outside of the [address] [place] [address] since your last visit? Include any pap smears or colon screening. no

## 2021-08-03 NOTE — PATIENT INSTRUCTIONS
Medicare Wellness Visit, Female     The best way to live healthy is to have a lifestyle where you eat a well-balanced diet, exercise regularly, limit alcohol use, and quit all forms of tobacco/nicotine, if applicable. Regular preventive services are another way to keep healthy. Preventive services (vaccines, screening tests, monitoring & exams) can help personalize your care plan, which helps you manage your own care. Screening tests can find health problems at the earliest stages, when they are easiest to treat. [facility] follows the current, evidence-based guidelines published by the [facility] ([facility]) when recommending preventive services for our patients. Because we follow these guidelines, sometimes recommendations change over time as research supports it. (For example, mammograms used to be recommended annually. Even though Medicare will still pay for an annual mammogram, the newer guidelines recommend a mammogram every two years for women of average risk). Of course, you and your doctor may decide to screen more often for some diseases, based on your risk and your co-morbidities (chronic disease you are already diagnosed with). Preventive services for you include:  - Medicare offers their members a free annual wellness visit, which is time for you and your primary care provider to discuss and plan for your preventive service needs. Take advantage of this benefit every year!  -All adults over the age of 72 should receive the recommended pneumonia vaccines. Current USPSTF guidelines recommend a series of two vaccines for the best pneumonia protection.   -All adults should have a flu vaccine yearly and a tetanus vaccine every 10 years.   -All adults age 48 and older should receive the shingles vaccines (series of two vaccines).       -All adults age 38-68 who are overweight should have a diabetes screening test once every three years.   -All adults born between [birth year] and 1966 should be screened once for Hepatitis C.  -Other screening tests and preventive services for persons with diabetes include: an eye exam to screen for diabetic retinopathy, a kidney function test, a foot exam, and stricter control over your cholesterol.   -Cardiovascular screening for adults with routine risk involves an electrocardiogram (ECG) at intervals determined by your doctor.   -Colorectal cancer screenings should be done for adults age 54-65 with no increased risk factors for colorectal cancer. There are a number of acceptable methods of screening for this type of cancer. Each test has its own benefits and drawbacks. Discuss with your doctor what is most appropriate for you during your annual wellness visit. The different tests include: colonoscopy (considered the best screening method), a fecal occult blood test, a fecal DNA test, and sigmoidoscopy.    -A bone mass density test is recommended when a woman turns 65 to screen for osteoporosis. This test is only recommended one time, as a screening. Some providers will use this same test as a disease monitoring tool if you already have osteoporosis. -Breast cancer screenings are recommended every other year for women of normal risk, age 54-69.  -Cervical cancer screenings for women over age 72 are only recommended with certain risk factors. Here is a list of your current Health Maintenance items (your personalized list of preventive services) with a due date:  Health Maintenance Due   Topic Date Due    COVID-19 Vaccine (1) Never done    DTaP/Tdap/Td  (1 - Tdap) Never done    Shingles Vaccine (1 of 2) Never done    Bone Mineral Density   Never done            Kegel Exercises: Care Instructions  Overview     Kegel exercises strengthen muscles around the bladder. These muscles control the flow of urine. Kegel exercises are sometime called \"pelvic floor\" exercises.  They can help prevent urine leakage and keep the pelvic organs in place. Kegel exercises can strengthen pelvic muscles that have been weakened by age, pregnancy, childbirth, and surgery. They may help prevent or treat urine leakage. You do Kegel exercises by tightening the muscles you use when you urinate. You will likely need to do these exercises for several weeks to get better. Follow-up care is a key part of your treatment and safety. Be sure to make and go to all appointments, and call your doctor if you are having problems. It's also a good idea to know your test results and keep a list of the medicines you take. How can you care for yourself at home? · Do Kegel exercises. ? Find the muscles you need to strengthen. To do this, tighten the muscles that stop your urine while you are going to the bathroom. These are the same muscles you squeeze during Kegel exercises. ? Squeeze the muscles as hard as you can. Your belly and thighs should not move. ? Hold the squeeze for 3 seconds. Then relax for 3 seconds. ? Start with 3 seconds, and then add 1 second each week until you are able to squeeze for 10 seconds. ? Repeat the exercise 10 to 15 times for each session. Do three or more sessions each day. · You can check to see if you are using the right muscles. ? Tighten the muscles that help you stop passing gas or keep you from urinating. Make sure you aren't using your stomach, leg, or buttock muscles. ? Place a finger in your vagina and squeeze around it. You are doing them right when you feel pressure around your finger. Your doctor may also suggest that you put special weights in your vagina while you do the exercises. · Check with your doctor if you don't notice a difference after trying these exercises for several weeks. Your doctor may suggest getting help from a physical therapist or recommend other treatment. Where can you learn more?   Go to http://www.gray.com/  Enter J729 in the search box to learn more about \"Kegel Exercises: Care Instructions. \"  Current as of: June 29, 2020               Content Version: 12.8  © 8319-2707 [employer]. Care instructions adapted under license by [employer] (which disclaims liability or warranty for this information). If you have questions about a medical condition or this instruction, always ask your healthcare professional. [place] [address] any warranty or liability for your use of this information.

## 2021-08-03 NOTE — PROGRESS NOTES
This is the Subsequent Medicare Annual Wellness Exam, performed 12 months or more after the Initial AWV or the last Subsequent AWV    I have reviewed the patient's medical history in detail and updated the computerized patient record. Assessment/Plan   Education and counseling provided:  Are appropriate based on today's review and evaluation  End-of-Life planning (with patient's consent)    1. Urinary frequency  -     AMB POC URINALYSIS DIP STICK AUTO W/O MICRO  2. Medicare annual wellness visit, subsequent       Depression Risk Factor Screening     3 most recent PHQ Screens 9/21/2020   Little interest or pleasure in doing things Not at all   Feeling down, depressed, irritable, or hopeless Nearly every day   Total Score PHQ 2 3   Trouble falling or staying asleep, or sleeping too much Nearly every day   Feeling tired or having little energy More than half the days   Poor appetite, weight loss, or overeating Not at all   Feeling bad about yourself - or that you are a failure or have let yourself or your family down Several days   Trouble concentrating on things such as school, work, reading, or watching TV More than half the days   Moving or speaking so slowly that other people could have noticed; or the opposite being so fidgety that others notice Not at all   Thoughts of being better off dead, or hurting yourself in some way Not at all   PHQ 9 Score 11       Alcohol Risk Screen    Do you average more than 1 drink per night or more than 7 drinks a week: On any one occasion in the past three months have you have had more than 3 drinks containing alcohol:          Functional Ability and Level of Safety    Hearing: Hearing is good. Activities of Daily Living:   The home contains: handrails and grab bars  Patient needs help with:  transportation, shopping, managing medications and managing money      Ambulation: with difficulty, uses a cane     Fall Risk:  Fall Risk Assessment, last 12 mths 8/3/2021 Able to walk? Yes   Fall in past 12 months? 0   Do you feel unsteady? 0   Are you worried about falling 0   Number of falls in past 12 months -   Fall with injury?  -      Abuse Screen:  Patient is not abused       Cognitive Screening    Has your family/caregiver stated any concerns about your memory: yes - dementia      Cognitive Screening: Abnormal - Mini Cog Test    Health Maintenance Due     Health Maintenance Due   Topic Date Due    COVID-19 Vaccine (1) Never done    DTaP/Tdap/Td series (1 - Tdap) Never done    Shingrix Vaccine Age 50> (1 of 2) Never done    Bone Densitometry (Dexa) Screening  Never done       Patient Care Team   Patient Care Team:  Darrin Faulkner MD as PCP - General  Darrin Faulkner MD as PCP - [facility] Lesli Crump Provider    History     Patient Active Problem List   Diagnosis Code    Hypercholesteremia E78.00    Hypothyroid E03.9    Allergic rhinitis J30.9    Elevated blood pressure reading without diagnosis of hypertension R03.0    Rosacea L71.9    Polycythemia, secondary D75.1    Subclavian steal syndrome G45.8    Spinal stenosis at L4-L5 level M48.061    Late onset Alzheimer's disease without behavioral disturbance (HCC) G30.1, F02.80    Depression, major, recurrent, mild ([facility] [address] F33.0     Past Medical History:   Diagnosis Date    Arthritis     Chronic pain 1998    feet pain    Hypercholesterolemia     Hypertension     borderline, no medication    Other ill-defined conditions(799.89)     peripheral artery disease in bilateral feet    Other ill-defined conditions(799.89)     incontinant of urine, chest cold dr silverio today    Rosacea 4/7/2011    Thyroid disease     Tobacco abuse, episodic 4/7/2011      Past Surgical History:   Procedure Laterality Date    ENDOSCOPY, COLON, DIAGNOSTIC      HX APPENDECTOMY      HX GYN      hysterectomy    HX KNEE REPLACEMENT      HX ORTHOPAEDIC      podiatry,arthroscopy on knees,mortons neuroma removed    HX ORTHOPAEDIC      knee repl;acement bilat 2017    HX UROLOGICAL      kidney stone removed    NV BREAST SURGERY PROCEDURE UNLISTED      right lumpectomy     Current Outpatient Medications   Medication Sig Dispense Refill    ascorbic acid (VITAMIN C PO) Take  by mouth.  levothyroxine (SYNTHROID) 75 mcg tablet Take 1 Tab by mouth Daily (before breakfast). 90 Tab 1    melatonin 10 mg tab Take 10 mg by mouth nightly.  cholecalciferol (VITAMIN D3) (2,000 UNITS /50 MCG) cap capsule Take 2,000 Units by mouth two (2) times a day.  acetaminophen (TYLENOL EXTRA STRENGTH) 500 mg tablet Take 500 mg by mouth as needed for Pain.  OTHER Take 1 Each by mouth daily. zeremin for dementia      clotrimazole-betamethasone (LOTRISONE) topical cream Apply  to affected area two (2) times a day. 30 g 1    NASONEX 50 mcg/actuation nasal spray USE 2 SPRAYS BY BOTH NOSTRILS ROUTE DAILY. (Patient not taking: Reported on 8/3/2021) 1 Container 2     Allergies   Allergen Reactions    Other Plant, Animal, Environmental Other (comments)     Seasonal allergies/ mold     Penicillins Hives    Sulfa (Sulfonamide Antibiotics) Itching and Drowsiness       Family History   Problem Relation Age of Onset    Heart Disease Brother     Hypertension Brother     Elevated Lipids Brother     Elevated Lipids Mother     Elevated Lipids Father     Breast Cancer Maternal Aunt 54    Breast Cancer Maternal Aunt         age unknown    Breast Cancer Maternal Aunt         age unknown     Social History     Tobacco Use    Smoking status: Former Smoker     Packs/day: 0.50     Years: 20.00     Pack years: 10.00     Types: Cigarettes     Quit date: 6/10/2013     Years since quittin.1    Smokeless tobacco: Never Used    Tobacco comment: 3-4 cigarettes monthly   Substance Use Topics    Alcohol use: Yes     Comment: monthly     This is an acute care visit on Mrs. Jacques Vigil. She is 79, has dementia. [facility].   She has been on drugs. They said she has a type of Alzheimer's dementia or an aphasia type dementia with gradual decline in some abilities. She has noticed some urinary incontinence. She has noticed that she gets urgency a lot. She has frequency a lot. She occasionally wets herself. She has to wear a pad at night when she goes to bed. She is still alert enough to understand this. During the day she usually is okay. The urinalysis is negative. I suggested Kegel exercises and a trial of low dose Detrol 2 mg one daily for urge incontinence. Follow up with me in 30 to 60 days to see how she is doing with this. Vitals:    08/03/21 1115   BP: 118/82   Pulse: 64   Resp: 12   Temp: 98.1 °F (36.7 °C)   TempSrc: Temporal   SpO2: 99%   Weight: 124 lb (56.2 kg)   Height: 5' 3\" (1.6 m)   The abdomen is soft without tenderness, guarding, mass, rebound or organomegaly. Bowel sounds are normal. No CVA tenderness or inguinal adenopathy noted. Well groomed      Results for orders placed or performed in visit on 08/03/21   AMB POC URINALYSIS DIP STICK AUTO W/O MICRO   Result Value Ref Range    Color (UA POC) Dark Yellow     Clarity (UA POC) Clear     Glucose (UA POC) Negative Negative    Bilirubin (UA POC) Negative Negative    Ketones (UA POC) Negative Negative    Specific gravity (UA POC) 1.020 1.001 - 1.035    Blood (UA POC) Negative Negative    pH (UA POC) 6.0 4.6 - 8.0    Protein (UA POC) Negative Negative    Urobilinogen (UA POC) 0.2 mg/dL 0.2 - 1    Nitrites (UA POC) Negative Negative    Leukocyte esterase (UA POC) Negative Negative       1. Urinary frequency  Trial of this discussed sie effects  - AMB POC URINALYSIS DIP STICK AUTO W/O MICRO  - tolterodine ER (DETROL-LA) 2 mg ER capsule; Take 1 Capsule by mouth daily. Dispense: 30 Capsule; Refill: 2    2.  Medicare annual wellness visit, subsequent  Will coordinate care with all the physicians and providers  Information on Kegels given      Jeancarlos Tang MD

## 2021-08-09 LAB — SARS-COV-2, NAA: NEGATIVE

## 2021-08-11 ENCOUNTER — TELEPHONE (OUTPATIENT)
Dept: INTERNAL MEDICINE CLINIC | Age: 81
End: 2021-08-11

## 2021-08-11 NOTE — TELEPHONE ENCOUNTER
Wrist fracture 2 days ago  Had pinning done at [address] [place] [address] now    Will follow up

## 2021-10-18 DIAGNOSIS — R35.0 URINARY FREQUENCY: ICD-10-CM

## 2021-10-18 RX ORDER — TOLTERODINE 2 MG/1
2 CAPSULE, EXTENDED RELEASE ORAL DAILY
Qty: 30 CAPSULE | Refills: 2 | Status: SHIPPED | OUTPATIENT
Start: 2021-10-18 | End: 2022-05-05

## 2022-02-14 RX ORDER — LEVOTHYROXINE SODIUM 75 UG/1
TABLET ORAL
Qty: 90 TABLET | Refills: 1 | Status: SHIPPED | OUTPATIENT
Start: 2022-02-14 | End: 2022-08-18

## 2022-03-18 PROBLEM — G30.1 LATE ONSET ALZHEIMER'S DISEASE WITHOUT BEHAVIORAL DISTURBANCE (HCC): Status: ACTIVE | Noted: 2020-06-22

## 2022-03-18 PROBLEM — G45.8 SUBCLAVIAN STEAL SYNDROME: Status: ACTIVE | Noted: 2018-09-14

## 2022-03-18 PROBLEM — F02.80 LATE ONSET ALZHEIMER'S DISEASE WITHOUT BEHAVIORAL DISTURBANCE (HCC): Status: ACTIVE | Noted: 2020-06-22

## 2022-03-19 PROBLEM — M48.061 SPINAL STENOSIS AT L4-L5 LEVEL: Status: ACTIVE | Noted: 2019-07-29

## 2022-03-19 PROBLEM — F33.0 DEPRESSION, MAJOR, RECURRENT, MILD (HCC): Status: ACTIVE | Noted: 2020-09-21

## 2022-05-05 ENCOUNTER — OFFICE VISIT (OUTPATIENT)
Dept: INTERNAL MEDICINE CLINIC | Age: 82
End: 2022-05-05
Payer: MEDICARE

## 2022-05-05 VITALS
WEIGHT: 140.4 LBS | HEIGHT: 63 IN | TEMPERATURE: 98.1 F | SYSTOLIC BLOOD PRESSURE: 125 MMHG | HEART RATE: 67 BPM | BODY MASS INDEX: 24.88 KG/M2 | OXYGEN SATURATION: 98 % | DIASTOLIC BLOOD PRESSURE: 81 MMHG | RESPIRATION RATE: 14 BRPM

## 2022-05-05 DIAGNOSIS — F34.1 DYSTHYMIA: ICD-10-CM

## 2022-05-05 DIAGNOSIS — F02.80 LATE ONSET ALZHEIMER'S DISEASE WITHOUT BEHAVIORAL DISTURBANCE (HCC): ICD-10-CM

## 2022-05-05 DIAGNOSIS — Z13.39 SCREENING FOR ALCOHOLISM: ICD-10-CM

## 2022-05-05 DIAGNOSIS — E55.9 VITAMIN D DEFICIENCY: ICD-10-CM

## 2022-05-05 DIAGNOSIS — G30.1 LATE ONSET ALZHEIMER'S DISEASE WITHOUT BEHAVIORAL DISTURBANCE (HCC): ICD-10-CM

## 2022-05-05 DIAGNOSIS — Z00.00 MEDICARE ANNUAL WELLNESS VISIT, SUBSEQUENT: ICD-10-CM

## 2022-05-05 DIAGNOSIS — F33.0 DEPRESSION, MAJOR, RECURRENT, MILD (HCC): ICD-10-CM

## 2022-05-05 DIAGNOSIS — Z78.0 POSTMENOPAUSAL STATE: ICD-10-CM

## 2022-05-05 DIAGNOSIS — E03.9 ACQUIRED HYPOTHYROIDISM: ICD-10-CM

## 2022-05-05 DIAGNOSIS — M80.00XD AGE-RELATED OSTEOPOROSIS WITH CURRENT PATHOLOGICAL FRACTURE WITH ROUTINE HEALING, SUBSEQUENT ENCOUNTER: ICD-10-CM

## 2022-05-05 DIAGNOSIS — E78.00 HYPERCHOLESTEREMIA: Primary | ICD-10-CM

## 2022-05-05 DIAGNOSIS — Z13.31 SCREENING FOR DEPRESSION: ICD-10-CM

## 2022-05-05 PROCEDURE — G9717 DOC PT DX DEP/BP F/U NT REQ: HCPCS | Performed by: INTERNAL MEDICINE

## 2022-05-05 PROCEDURE — G8420 CALC BMI NORM PARAMETERS: HCPCS | Performed by: INTERNAL MEDICINE

## 2022-05-05 PROCEDURE — 1090F PRES/ABSN URINE INCON ASSESS: CPT | Performed by: INTERNAL MEDICINE

## 2022-05-05 PROCEDURE — 99214 OFFICE O/P EST MOD 30 MIN: CPT | Performed by: INTERNAL MEDICINE

## 2022-05-05 PROCEDURE — 1101F PT FALLS ASSESS-DOCD LE1/YR: CPT | Performed by: INTERNAL MEDICINE

## 2022-05-05 PROCEDURE — G8536 NO DOC ELDER MAL SCRN: HCPCS | Performed by: INTERNAL MEDICINE

## 2022-05-05 PROCEDURE — G8427 DOCREV CUR MEDS BY ELIG CLIN: HCPCS | Performed by: INTERNAL MEDICINE

## 2022-05-05 PROCEDURE — G0439 PPPS, SUBSEQ VISIT: HCPCS | Performed by: INTERNAL MEDICINE

## 2022-05-05 RX ORDER — CITALOPRAM 10 MG/1
10 TABLET ORAL DAILY
Qty: 30 TABLET | Refills: 2 | Status: SHIPPED | OUTPATIENT
Start: 2022-05-05 | End: 2022-05-31 | Stop reason: SDUPTHER

## 2022-05-05 NOTE — PROGRESS NOTES
Reviewed record in preparation for visit and have obtained necessary documentation. Identified pt with two pt identifiers(name and ). Chief Complaint   Patient presents with    Annual Wellness Visit     Vitals:    22 0840   BP: 125/81   Pulse: 67   Resp: 14   Temp: 98.1 °F (36.7 °C)   TempSrc: Temporal   SpO2: 98%   Weight: 140 lb 6.4 oz (63.7 kg)   Height: 5' 3\" (1.6 m)        Health Maintenance Due   Topic Date Due    DTaP/Tdap/Td  (1 - Tdap) Never done    Shingles Vaccine (1 of 2) Never done    Bone Mineral Density   Never done    COVID-19 Vaccine (3 - Booster for [employer] series) 2021    Depression Monitoring  2021       Ms. Chantell Preciado has a reminder for a \"due or due soon\" health maintenance. I have asked that she discuss this further with her primary care provider for follow-up on this health maintenance. Coordination of Care Questionnaire:  :     1) Have you been to an emergency room, urgent care clinic since your last visit? no   Hospitalized since your last visit? no             2) Have you seen or consulted any other health care providers outside of [address] [place] [address] since your last visit? no  (Include any pap smears or colon screenings in this section.)    3. For patients aged 39-70: Has the patient had a colonoscopy / FIT/ Cologuard? NA - based on age      If the patient is female:  4. For patients aged 41-77: Has the patient had a mammogram within the past 2 years? NA - based on age or sex       11. For patients aged 21-65: Has the patient had a pap smear? NA - based on age or sex      In the event something were to happen to you and you were unable to speak on your behalf, do you have an Advance Directive/ Living Will in place stating your wishes?  NO    Do you have an Advance Directive on file? no    6) Are you interested in receiving information on Advance Directives? no    Patient is accompanied by  I have received verbal consent from Tiffanie Nunez to discuss any/all medical information while they are present in the room.

## 2022-05-05 NOTE — PATIENT INSTRUCTIONS

## 2022-05-05 NOTE — PROGRESS NOTES
This is the Subsequent Medicare Annual Wellness Exam, performed 12 months or more after the Initial AWV or the last Subsequent AWV    I have reviewed the patient's medical history in detail and updated the computerized patient record. Assessment/Plan   Education and counseling provided:  Are appropriate based on today's review and evaluation  Pneumococcal Vaccine  Influenza Vaccine  Bone mass measurement (DEXA)  Screening for glaucoma    1. Hypercholesteremia  -     CBC WITH AUTOMATED DIFF; Future  -     LIPID PANEL; Future  -     METABOLIC PANEL, COMPREHENSIVE; Future  2. Late onset Alzheimer's disease without behavioral disturbance ([facility]  Assessment & Plan:   monitored by specialist. No acute findings meriting change in the plan  3. Depression, major, recurrent, mild ([facility]  4. Dysthymia  5. Acquired hypothyroidism  -     TSH 3RD GENERATION; Future  6. Age-related osteoporosis with current pathological fracture with routine healing, subsequent encounter  -     VITAMIN D, 25 HYDROXY; Future  7. Vitamin D deficiency  -     VITAMIN D, 25 HYDROXY; Future  8. Medicare annual wellness visit, subsequent  9. Postmenopausal state  -     DEXA BONE DENSITY STUDY AXIAL; Future  10. Screening for depression  -     DEPRESSION SCREEN ANNUAL  11.  Screening for alcoholism  -     [place] ANNUAL ALCOHOL SCREEN 15 MIN     Suggest less alcohol as contibutes to fall risk and confusion  Depression Risk Factor Screening     3 most recent PHQ Screens 5/5/2022   Little interest or pleasure in doing things Several days   Feeling down, depressed, irritable, or hopeless Several days   Total Score PHQ 2 2   Trouble falling or staying asleep, or sleeping too much Not at all   Feeling tired or having little energy Not at all   Poor appetite, weight loss, or overeating Not at all   Feeling bad about yourself - or that you are a failure or have let yourself or your family down Not at all   Trouble concentrating on things such as school, work, reading, or watching TV Not at all   Moving or speaking so slowly that other people could have noticed; or the opposite being so fidgety that others notice Not at all   Thoughts of being better off dead, or hurting yourself in some way Not at all   PHQ 9 Score 2   How difficult have these problems made it for you to do your work, take care of your home and get along with others Not difficult at all     She is teary poor sleep depressed mood often  Alcohol & Drug Abuse Risk Screen    Do you average more than 1 drink per night or more than 7 drinks a week:  No  1 wi8ne    On any one occasion in the past three months have you have had more than 3 drinks containing alcohol:  No          Functional Ability and Level of Safety    Hearing: Hearing is good. Activities of Daily Living: The home contains: handrails and grab bars  Patient needs help with:  transportation, shopping, preparing meals, laundry, housework, managing medications and managing money      Ambulation: with mild difficulty     Fall Risk:  Fall Risk Assessment, last 12 mths 5/5/2022   Able to walk? Yes   Fall in past 12 months? 0   Do you feel unsteady? 0   Are you worried about falling 0   Number of falls in past 12 months -   Fall with injury?  -      Abuse Screen:  Patient is not abused       Cognitive Screening    Has your family/caregiver stated any concerns about your memory: yes - dementia for 3 years     Cognitive Screening: Abnormal - Mini Cog Test    Health Maintenance Due     Health Maintenance Due   Topic Date Due    DTaP/Tdap/Td series (1 - Tdap) Never done    Shingrix Vaccine Age 50> (1 of 2) Never done    Bone Densitometry (Dexa) Screening  Never done    Depression Monitoring  09/21/2021       Patient Care Team   Patient Care Team:  Sohan Tavarez MD as PCP - General  Sohan Tavarez MD as PCP - REHABILITATION [facility] Empaneled Provider    History     Patient Active Problem List   Diagnosis Code    Hypercholesteremia E78.00    Hypothyroid E03.9    Allergic rhinitis J30.9    Elevated blood pressure reading without diagnosis of hypertension R03.0    Rosacea L71.9    Polycythemia, secondary D75.1    Subclavian steal syndrome G45.8    Spinal stenosis at L4-L5 level M48.061    Late onset Alzheimer's disease without behavioral disturbance (HCC) G30.1, F02.80    Depression, major, recurrent, mild (HCC) F33.0     Past Medical History:   Diagnosis Date    Arthritis     Chronic pain 1998    feet pain    Hypercholesterolemia     Hypertension     borderline, no medication    Other ill-defined conditions(799.89)     peripheral artery disease in bilateral feet    Other ill-defined conditions(799.89)     incontinant of urine, chest cold dr appt today    Rosacea 4/7/2011    Thyroid disease     Tobacco abuse, episodic 4/7/2011      Past Surgical History:   Procedure Laterality Date    ENDOSCOPY, COLON, DIAGNOSTIC      HX APPENDECTOMY      HX GYN      hysterectomy    HX KNEE REPLACEMENT      HX ORTHOPAEDIC      podiatry,arthroscopy on knees,mortons neuroma removed    HX ORTHOPAEDIC      knee repl;acement bilat 2017 2018    HX UROLOGICAL      kidney stone removed    OH BREAST SURGERY PROCEDURE UNLISTED      right lumpectomy     Current Outpatient Medications   Medication Sig Dispense Refill    levothyroxine (SYNTHROID) 75 mcg tablet TAKE 1 TABLET BY MOUTH EVERY DAY BEFORE BREAKFAST 90 Tablet 1    ascorbic acid (VITAMIN C PO) Take  by mouth.  melatonin 10 mg tab Take 10 mg by mouth nightly.  OTHER Take 1 Each by mouth daily. zeremin for dementia      cholecalciferol (VITAMIN D3) (2,000 UNITS /50 MCG) cap capsule Take 2,000 Units by mouth two (2) times a day.  acetaminophen (TYLENOL EXTRA STRENGTH) 500 mg tablet Take 500 mg by mouth as needed for Pain.  NASONEX 50 mcg/actuation nasal spray USE 2 SPRAYS BY BOTH NOSTRILS ROUTE DAILY.  (Patient not taking: Reported on 5/5/2022) 1 Container 2     Allergies   Allergen Reactions  Other Plant, Animal, Environmental Other (comments)     Seasonal allergies/ mold     Penicillins Hives    Sulfa (Sulfonamide Antibiotics) Itching and Drowsiness       Family History   Problem Relation Age of Onset    Heart Disease Brother     Hypertension Brother     Elevated Lipids Brother     Elevated Lipids Mother     Elevated Lipids Father     Breast Cancer Maternal Aunt 54    Breast Cancer Maternal Aunt         age unknown    Breast Cancer Maternal Aunt         age unknown     Social History     Tobacco Use    Smoking status: Former Smoker     Packs/day: 0.50     Years: 20.00     Pack years: 10.00     Types: Cigarettes     Quit date: 6/10/2013     Years since quittin.9    Smokeless tobacco: Never Used    Tobacco comment: 3-4 cigarettes monthly   Substance Use Topics    Alcohol use: Yes     Comment: monthly     This is an elderly 79 y.o. diagnosed with Alzheimer's with some kind of dementia about three and a half years ago. It was seen by [facility], Dr. Yann Monroe. They have really have not followed up with him because he never offered them any therapy. The patient has used occasional nutritional supplements to try to help with the dementia. None of the cognitive supplements have been helpful. She only takes levothyroxine daily, does not take anything else.  admits that she does not take it on an empty stomach and takes it with food in general.  He has to remind her to take her meds. She takes a bunch of vitamins, one is an eye vitamin, but she has not had an eye exam in a couple of years. She takes vitamin D 1000 units a day. She has got osteoporosis likely by history because she had a fall earlier in  and fractured her wrist which was treated and she healed. She has had a depressed mood lately. She has been down in the dumps and sad.  says she cries occasionally. Her sleep is poor. Her appetite is good. He gives hear a pretty good diet.  has to do much of the care. Her confusion she thinks is stable.  says it is probably not any worse than it was six months ago. Exam today, her blood pressure was normal.  She had a regular pulse. She had clear lungs. She had a regular S1, regular S2 without a murmur. Abdomen was soft. I looked at her feet. She has got some overlapping toes. She has some poor nail care. She has no signs of any ulcers or infection. She has got venous insufficiency in her lower legs and trace of edema. Vitals:    05/05/22 0840   BP: 125/81   Pulse: 67   Resp: 14   Temp: 98.1 °F (36.7 °C)   TempSrc: Temporal   SpO2: 98%   Weight: 140 lb 6.4 oz (63.7 kg)   Height: 5' 3\" (1.6 m)     1. Late onset Alzheimer's disease without behavioral disturbance ([facility]  3 years not too much wrse wanders some    2. Depression, major, recurrent, mild (HCC)  Try for 6 weeks  - citalopram (CELEXA) 10 mg tablet; Take 1 Tablet by mouth daily. Dispense: 30 Tablet; Refill: 2    3. Hypercholesteremia  follow  - CBC WITH AUTOMATED DIFF; Future  - LIPID PANEL; Future  - METABOLIC PANEL, COMPREHENSIVE; Future  - CBC WITH AUTOMATED DIFF  - LIPID PANEL  - METABOLIC PANEL, COMPREHENSIVE    4. Dysthymia  Trial ssri    5. Acquired hypothyroidism  Labs  Not taking correctly  - TSH 3RD GENERATION; Future  - TSH 3RD GENERATION    6. Age-related osteoporosis with current pathological fracture with routine healing, subsequent encounter  Needs fosamax  - VITAMIN D, 25 HYDROXY; Future  - VITAMIN D, 25 HYDROXY    7. Vitamin D deficiency  On 1000 units daily  - VITAMIN D, 25 HYDROXY; Future  - VITAMIN D, 25 HYDROXY    8. Medicare annual wellness visit, subsequent  reviewed    9. Postmenopausal state  dexa      covid vaccinations reviewed and priority reviewed and my advice to get vaccinated reviewed second booster    - DEXA BONE DENSITY STUDY AXIAL; Future    10. Screening for depression  Done and discussed  - Elise Duran    11. Screening for alcoholism  Minor use  - [place] ANNUAL ALCOHOL SCREEN 15 MIN  suugest podiatry to cut nails    Augustin Chang MD

## 2022-05-06 LAB
25(OH)D3+25(OH)D2 SERPL-MCNC: 41.1 NG/ML (ref 30–100)
ALBUMIN SERPL-MCNC: 4.1 G/DL (ref 3.6–4.6)
ALBUMIN/GLOB SERPL: 1.6 {RATIO} (ref 1.2–2.2)
ALP SERPL-CCNC: 88 IU/L (ref 44–121)
ALT SERPL-CCNC: 11 IU/L (ref 0–32)
AST SERPL-CCNC: 16 IU/L (ref 0–40)
BASOPHILS # BLD AUTO: 0 X10E3/UL (ref 0–0.2)
BASOPHILS NFR BLD AUTO: 0 %
BILIRUB SERPL-MCNC: 0.5 MG/DL (ref 0–1.2)
BUN SERPL-MCNC: 30 MG/DL (ref 8–27)
BUN/CREAT SERPL: 32 (ref 12–28)
CALCIUM SERPL-MCNC: 9.7 MG/DL (ref 8.7–10.3)
CHLORIDE SERPL-SCNC: 105 MMOL/L (ref 96–106)
CHOLEST SERPL-MCNC: 213 MG/DL (ref 100–199)
CO2 SERPL-SCNC: 22 MMOL/L (ref 20–29)
CREAT SERPL-MCNC: 0.94 MG/DL (ref 0.57–1)
EGFR: 60 ML/MIN/1.73
EOSINOPHIL # BLD AUTO: 0.2 X10E3/UL (ref 0–0.4)
EOSINOPHIL NFR BLD AUTO: 3 %
ERYTHROCYTE [DISTWIDTH] IN BLOOD BY AUTOMATED COUNT: 12.2 % (ref 11.7–15.4)
GLOBULIN SER CALC-MCNC: 2.5 G/DL (ref 1.5–4.5)
GLUCOSE SERPL-MCNC: 83 MG/DL (ref 65–99)
HCT VFR BLD AUTO: 44.5 % (ref 34–46.6)
HDLC SERPL-MCNC: 68 MG/DL
HGB BLD-MCNC: 14.4 G/DL (ref 11.1–15.9)
IMM GRANULOCYTES # BLD AUTO: 0 X10E3/UL (ref 0–0.1)
IMM GRANULOCYTES NFR BLD AUTO: 0 %
IMP & REVIEW OF LAB RESULTS: NORMAL
LDLC SERPL CALC-MCNC: 139 MG/DL (ref 0–99)
LYMPHOCYTES # BLD AUTO: 2.1 X10E3/UL (ref 0.7–3.1)
LYMPHOCYTES NFR BLD AUTO: 29 %
MCH RBC QN AUTO: 29.8 PG (ref 26.6–33)
MCHC RBC AUTO-ENTMCNC: 32.4 G/DL (ref 31.5–35.7)
MCV RBC AUTO: 92 FL (ref 79–97)
MONOCYTES # BLD AUTO: 0.6 X10E3/UL (ref 0.1–0.9)
MONOCYTES NFR BLD AUTO: 8 %
NEUTROPHILS # BLD AUTO: 4.4 X10E3/UL (ref 1.4–7)
NEUTROPHILS NFR BLD AUTO: 60 %
PLATELET # BLD AUTO: 234 X10E3/UL (ref 150–450)
POTASSIUM SERPL-SCNC: 4.2 MMOL/L (ref 3.5–5.2)
PROT SERPL-MCNC: 6.6 G/DL (ref 6–8.5)
RBC # BLD AUTO: 4.83 X10E6/UL (ref 3.77–5.28)
SODIUM SERPL-SCNC: 143 MMOL/L (ref 134–144)
TRIGL SERPL-MCNC: 32 MG/DL (ref 0–149)
TSH SERPL DL<=0.005 MIU/L-ACNC: 0.85 UIU/ML (ref 0.45–4.5)
VLDLC SERPL CALC-MCNC: 6 MG/DL (ref 5–40)
WBC # BLD AUTO: 7.2 X10E3/UL (ref 3.4–10.8)

## 2022-05-13 ENCOUNTER — HOSPITAL ENCOUNTER (OUTPATIENT)
Dept: MAMMOGRAPHY | Age: 82
Discharge: HOME | End: 2022-05-13
Attending: INTERNAL MEDICINE
Payer: MEDICARE

## 2022-05-13 DIAGNOSIS — Z78.0 POSTMENOPAUSAL STATE: ICD-10-CM

## 2022-05-13 PROCEDURE — 77080 DXA BONE DENSITY AXIAL: CPT

## 2022-05-17 NOTE — PROGRESS NOTES
LM on VM to return call to office regarding dexa results, patient needs appointment to come and discuss treatment. Letter sent.     Has osteoporosis on dexa ask her and  to come in so I can review treatment

## 2022-05-31 DIAGNOSIS — F33.0 DEPRESSION, MAJOR, RECURRENT, MILD (HCC): ICD-10-CM

## 2022-05-31 RX ORDER — CITALOPRAM 10 MG/1
10 TABLET ORAL DAILY
Qty: 30 TABLET | Refills: 2 | Status: SHIPPED | OUTPATIENT
Start: 2022-05-31 | End: 2022-06-03 | Stop reason: SDUPTHER

## 2022-05-31 NOTE — TELEPHONE ENCOUNTER
Future Appointments:  Future Appointments   Date Time Provider Latonia Barboza   6/16/2022 10:45 AM Yoshi Cortez MD [facility]        Last Appointment With Me:  5/5/2022     Requested Prescriptions     Pending Prescriptions Disp Refills    citalopram (CELEXA) 10 mg tablet 30 Tablet 2     Sig: Take 1 Tablet by mouth daily.

## 2022-06-03 DIAGNOSIS — F33.0 DEPRESSION, MAJOR, RECURRENT, MILD (HCC): ICD-10-CM

## 2022-06-04 NOTE — TELEPHONE ENCOUNTER
Future Appointments:  Future Appointments   Date Time Provider Latonia Barboza   6/16/2022 10:45 AM Lisa Bae MD [facility]        Last Appointment With Me:  5/5/2022     Requested Prescriptions     Pending Prescriptions Disp Refills    citalopram (CELEXA) 10 mg tablet 30 Tablet 2     Sig: Take 1 Tablet by mouth daily. Patient is here vor Hepatitis B vaccine #1 injection. Given IM to right deltoid.

## 2022-06-05 RX ORDER — CITALOPRAM 10 MG/1
10 TABLET ORAL DAILY
Qty: 30 TABLET | Refills: 2 | Status: SHIPPED | OUTPATIENT
Start: 2022-06-05 | End: 2022-06-16 | Stop reason: SDUPTHER

## 2022-06-17 ENCOUNTER — TELEPHONE (OUTPATIENT)
Dept: INTERNAL MEDICINE CLINIC | Age: 82
End: 2022-06-17

## 2022-06-17 NOTE — TELEPHONE ENCOUNTER
----- Message from Anthonyneo Nikita sent at 6/17/2022  1:56 PM EDT -----  Subject: Message to Provider    QUESTIONS  Information for Provider? Spoke with patient's  Miles Oden, he is needing   to know if his wife may have left her purse there yesterday. Please return   his call to let him know if you should find it or not. Thank you  ---------------------------------------------------------------------------  --------------  CALL BACK INFO  What is the best way for the office to contact you? OK to leave message on   voicemail  Preferred Call Back Phone Number? [phone]  ---------------------------------------------------------------------------  --------------  SCRIPT ANSWERS  Relationship to Patient? Other  Representative Name? Miles Oden / spouse  Is the Representative on the appropriate HIPAA document in Epic?  Yes

## 2022-08-18 RX ORDER — LEVOTHYROXINE SODIUM 75 UG/1
TABLET ORAL
Qty: 90 TABLET | Refills: 1 | Status: SHIPPED | OUTPATIENT
Start: 2022-08-18

## 2022-09-19 ENCOUNTER — OFFICE VISIT (OUTPATIENT)
Dept: INTERNAL MEDICINE CLINIC | Age: 82
End: 2022-09-19
Payer: MEDICARE

## 2022-09-19 VITALS
HEIGHT: 63 IN | TEMPERATURE: 98.2 F | WEIGHT: 135 LBS | RESPIRATION RATE: 16 BRPM | BODY MASS INDEX: 23.92 KG/M2 | DIASTOLIC BLOOD PRESSURE: 65 MMHG | SYSTOLIC BLOOD PRESSURE: 113 MMHG | HEART RATE: 62 BPM | OXYGEN SATURATION: 97 %

## 2022-09-19 DIAGNOSIS — N39.44 URINARY INCONTINENCE, NOCTURNAL ENURESIS: ICD-10-CM

## 2022-09-19 DIAGNOSIS — G30.1 LATE ONSET ALZHEIMER'S DISEASE WITHOUT BEHAVIORAL DISTURBANCE (HCC): ICD-10-CM

## 2022-09-19 DIAGNOSIS — Z78.9 DECREASED ACTIVITIES OF DAILY LIVING (ADL): ICD-10-CM

## 2022-09-19 DIAGNOSIS — F02.80 LATE ONSET ALZHEIMER'S DISEASE WITHOUT BEHAVIORAL DISTURBANCE (HCC): ICD-10-CM

## 2022-09-19 DIAGNOSIS — S41.112A SKIN TEAR OF LEFT UPPER EXTREMITY: Primary | ICD-10-CM

## 2022-09-19 DIAGNOSIS — F33.0 DEPRESSION, MAJOR, RECURRENT, MILD (HCC): ICD-10-CM

## 2022-09-19 LAB
APPEARANCE UR: CLEAR
BACTERIA URNS QL MICRO: NEGATIVE /HPF
BILIRUB UR QL: NEGATIVE
COLOR UR: ABNORMAL
EPITH CASTS URNS QL MICRO: ABNORMAL /LPF
GLUCOSE UR STRIP.AUTO-MCNC: NEGATIVE MG/DL
HGB UR QL STRIP: NEGATIVE
HYALINE CASTS URNS QL MICRO: ABNORMAL /LPF (ref 0–5)
KETONES UR QL STRIP.AUTO: NEGATIVE MG/DL
LEUKOCYTE ESTERASE UR QL STRIP.AUTO: ABNORMAL
NITRITE UR QL STRIP.AUTO: NEGATIVE
PH UR STRIP: 5.5 [PH] (ref 5–8)
PROT UR STRIP-MCNC: NEGATIVE MG/DL
RBC #/AREA URNS HPF: ABNORMAL /HPF (ref 0–5)
SP GR UR REFRACTOMETRY: 1.02 (ref 1–1.03)
UA: UC IF INDICATED,UAUC: ABNORMAL
UROBILINOGEN UR QL STRIP.AUTO: 0.2 EU/DL (ref 0.2–1)
WBC URNS QL MICRO: ABNORMAL /HPF (ref 0–4)

## 2022-09-19 PROCEDURE — 99214 OFFICE O/P EST MOD 30 MIN: CPT | Performed by: INTERNAL MEDICINE

## 2022-09-19 PROCEDURE — G8399 PT W/DXA RESULTS DOCUMENT: HCPCS | Performed by: INTERNAL MEDICINE

## 2022-09-19 PROCEDURE — G8536 NO DOC ELDER MAL SCRN: HCPCS | Performed by: INTERNAL MEDICINE

## 2022-09-19 PROCEDURE — G9717 DOC PT DX DEP/BP F/U NT REQ: HCPCS | Performed by: INTERNAL MEDICINE

## 2022-09-19 PROCEDURE — 1101F PT FALLS ASSESS-DOCD LE1/YR: CPT | Performed by: INTERNAL MEDICINE

## 2022-09-19 PROCEDURE — 1090F PRES/ABSN URINE INCON ASSESS: CPT | Performed by: INTERNAL MEDICINE

## 2022-09-19 PROCEDURE — G8420 CALC BMI NORM PARAMETERS: HCPCS | Performed by: INTERNAL MEDICINE

## 2022-09-19 PROCEDURE — G8427 DOCREV CUR MEDS BY ELIG CLIN: HCPCS | Performed by: INTERNAL MEDICINE

## 2022-09-19 RX ORDER — CITALOPRAM 10 MG/1
10 TABLET ORAL DAILY
Qty: 90 TABLET | Refills: 0 | Status: SHIPPED | OUTPATIENT
Start: 2022-09-19

## 2022-09-19 NOTE — PROGRESS NOTES
Ninety days ago she was started on Celexa 10 mg. Before that she had been taking it, but very haphazard. Her  says he makes sure she gets it every day. He says that she is calmer, less agitated and maybe little bit less depressed and is happy about that. She has got pretty significant dementia. He is not clear that she really showers every day, nor that she showers at all and he is not clear that she washes her hair. I told them that he really should get an aide in to try to help her with her activities of daily living. He has so far been resistant to that. I also told him that we can leave her on the Celexa because it has helped her with anxiety and agitation. She is eating well. Yesterday while grabbing the remote for the TV from her  somehow she scratched her left forearm and has a skin tear that is new. He applied a band-aid and some topical antibiotic ointment. Exam, she is alert and oriented. Vitals are stable. There is a fairly large skin tear of the left forearm that was cleaned. I went ahead and redressed it with a large band-aid and a triple antibiotic ointment. I told the  to continue use of ointment on the area. It could be either Vaseline or an antibiotic ointment and put a light dressing. He knows that it will take a couple of weeks to heal.  I refilled her Celexa for ninety days, suggested that he get some help for her in reference to her activities of daily living.     Patient Active Problem List    Diagnosis    Depression, major, recurrent, mild ([address]    Late onset Alzheimer's disease without behavioral disturbance ([address]    Spinal stenosis at L4-L5 level    Subclavian steal syndrome    Polycythemia, secondary    Elevated blood pressure reading without diagnosis of hypertension    Rosacea    Allergic rhinitis    Hypercholesteremia    Hypothyroid     Genito-Urinary ROS: positive for - nocturia and night time incoontinence  Vitals:    09/19/22 0838 09/19/22 0843   BP: (!) 146/72 113/65   Pulse: 62    Resp: 16    Temp: 98.2 °F (36.8 °C)    TempSrc: Temporal    SpO2: 97%    Weight: 135 lb (61.2 kg)    Height: 5' 3\" (1.6 m)      Wt Readings from Last 3 Encounters:   09/19/22 135 lb (61.2 kg)   06/16/22 134 lb 3.2 oz (60.9 kg)   05/05/22 140 lb 6.4 oz (63.7 kg)     1. Skin tear of left upper extremity  Use daily non stick dressing    2. Urinary incontinence, nocturnal enuresis  Will need   diapers ok at night  - URINALYSIS W/ REFLEX CULTURE; Future    3. Depression, major, recurrent, mild (HCC)  Better  refill  - citalopram (CELEXA) 10 mg tablet; Take 1 Tablet by mouth daily. Dispense: 90 Tablet; Refill: 0    4. Late onset Alzheimer's disease without behavioral disturbance ([facility]  worse    5.  Decreased activities of daily living (ADL)  Suggest getting her help with personal care  by an  aide once or twice a week        Prolonged visit with 15 minutes of time out  of more than a  25 minute visit spent counseling patient and family and formulation of care and doing all the pain medication regulatory requirements

## 2022-09-19 NOTE — PROGRESS NOTES
Chief Complaint   Patient presents with    Depression     Follow up    Skin Problem     Wound on left lower forearm x 1 day       Vitals:    09/19/22 0838 09/19/22 0843   BP: (!) 146/72 113/65   Pulse: 62    Resp: 16    Temp: 98.2 °F (36.8 °C)    TempSrc: Temporal    SpO2: 97%    Weight: 135 lb (61.2 kg)    Height: 5' 3\" (1.6 m)    PainSc:   0 - No pain          Health Maintenance will be followed up by PCP. 1. Have you been to the ER, urgent care clinic since your last visit? Hospitalized since your last visit? No    2. Have you seen or consulted any other health care providers outside of the [address] [place] [address] since your last visit? Include any pap smears or colon screening.  No

## 2022-12-19 ENCOUNTER — OFFICE VISIT (OUTPATIENT)
Dept: INTERNAL MEDICINE CLINIC | Age: 82
End: 2022-12-19
Payer: MEDICARE

## 2022-12-19 VITALS
WEIGHT: 144.8 LBS | OXYGEN SATURATION: 96 % | TEMPERATURE: 97.6 F | SYSTOLIC BLOOD PRESSURE: 116 MMHG | RESPIRATION RATE: 18 BRPM | HEART RATE: 74 BPM | BODY MASS INDEX: 25.66 KG/M2 | HEIGHT: 63 IN | DIASTOLIC BLOOD PRESSURE: 71 MMHG

## 2022-12-19 DIAGNOSIS — Z78.9 DECREASED ACTIVITIES OF DAILY LIVING (ADL): Primary | ICD-10-CM

## 2022-12-19 DIAGNOSIS — F33.0 DEPRESSION, MAJOR, RECURRENT, MILD (HCC): ICD-10-CM

## 2022-12-19 DIAGNOSIS — N32.81 OAB (OVERACTIVE BLADDER): ICD-10-CM

## 2022-12-19 DIAGNOSIS — F34.1 DYSTHYMIA: ICD-10-CM

## 2022-12-19 DIAGNOSIS — N39.44 URINARY INCONTINENCE, NOCTURNAL ENURESIS: ICD-10-CM

## 2022-12-19 PROCEDURE — 1101F PT FALLS ASSESS-DOCD LE1/YR: CPT | Performed by: INTERNAL MEDICINE

## 2022-12-19 PROCEDURE — G8536 NO DOC ELDER MAL SCRN: HCPCS | Performed by: INTERNAL MEDICINE

## 2022-12-19 PROCEDURE — G8399 PT W/DXA RESULTS DOCUMENT: HCPCS | Performed by: INTERNAL MEDICINE

## 2022-12-19 PROCEDURE — G9717 DOC PT DX DEP/BP F/U NT REQ: HCPCS | Performed by: INTERNAL MEDICINE

## 2022-12-19 PROCEDURE — 1090F PRES/ABSN URINE INCON ASSESS: CPT | Performed by: INTERNAL MEDICINE

## 2022-12-19 PROCEDURE — G8417 CALC BMI ABV UP PARAM F/U: HCPCS | Performed by: INTERNAL MEDICINE

## 2022-12-19 PROCEDURE — 99213 OFFICE O/P EST LOW 20 MIN: CPT | Performed by: INTERNAL MEDICINE

## 2022-12-19 PROCEDURE — G8427 DOCREV CUR MEDS BY ELIG CLIN: HCPCS | Performed by: INTERNAL MEDICINE

## 2022-12-19 RX ORDER — CITALOPRAM 10 MG/1
10 TABLET ORAL DAILY
Qty: 90 TABLET | Refills: 1 | Status: SHIPPED | OUTPATIENT
Start: 2022-12-19

## 2022-12-19 RX ORDER — LEVOTHYROXINE SODIUM 75 UG/1
75 TABLET ORAL
Qty: 90 TABLET | Refills: 1 | Status: SHIPPED | OUTPATIENT
Start: 2022-12-19

## 2022-12-19 RX ORDER — TOLTERODINE 2 MG/1
2 CAPSULE, EXTENDED RELEASE ORAL DAILY
Qty: 30 CAPSULE | Refills: 2 | Status: SHIPPED | OUTPATIENT
Start: 2022-12-19

## 2022-12-19 NOTE — PROGRESS NOTES
Chief Complaint   Patient presents with    Follow-up          Vitals:    12/19/22 0854   BP: 116/71   Pulse: 74   Resp: 18   Temp: 97.6 °F (36.4 °C)   TempSrc: Temporal   SpO2: 96%   Weight: 144 lb 12.8 oz (65.7 kg)   Height: 5' 3\" (1.6 m)   PainSc:   0 - No pain       Current Outpatient Medications on File Prior to Visit   Medication Sig Dispense Refill    OTHER Neurodrine 1 capsule daily      citalopram (CELEXA) 10 mg tablet Take 1 Tablet by mouth daily. 90 Tablet 0    levothyroxine (SYNTHROID) 75 mcg tablet TAKE 1 TABLET BY MOUTH EVERY DAY BEFORE BREAKFAST 90 Tablet 1    ascorbic acid (VITAMIN C PO) Take  by mouth. cholecalciferol (VITAMIN D3) (2,000 UNITS /50 MCG) cap capsule Take 2,000 Units by mouth two (2) times a day. OTHER Take 1 Each by mouth daily. zeremin for dementia (Patient not taking: No sig reported)      acetaminophen (TYLENOL) 500 mg tablet Take 500 mg by mouth as needed for Pain. (Patient not taking: No sig reported)       No current facility-administered medications on file prior to visit. Health Maintenance Due   Topic    DTaP/Tdap/Td series (1 - Tdap)    Shingrix Vaccine Age 50> (1 of 2)    COVID-19 Vaccine (4 - Booster for Schneider Peter series)       1. \"Have you been to the ER, urgent care clinic since your last visit? Hospitalized since your last visit? \" No    2. \"Have you seen or consulted any other health care providers outside of the [address] [place] [address] since your last visit? \" No     3. For patients aged 39-70: Has the patient had a colonoscopy / FIT/ Cologuard? NA - based on age      If the patient is female:    4. For patients aged 41-77: Has the patient had a mammogram within the past 2 years? NA - based on age or sex      11. For patients aged 21-65: Has the patient had a pap smear?  NA - based on age or sex

## 2022-12-19 NOTE — PROGRESS NOTES
Dada Benavidez is a 79 y.o. female with the following history as recorded in [facility]:  Patient Active Problem List    Diagnosis Date Noted    Depression, major, recurrent, mild ([facility] 09/21/2020    Late onset Alzheimer's disease without behavioral disturbance ([facility] 06/22/2020    Spinal stenosis at L4-L5 level 07/29/2019    Subclavian steal syndrome 09/14/2018    Polycythemia, secondary 02/12/2014    Elevated blood pressure reading without diagnosis of hypertension 04/07/2011    Rosacea 04/07/2011    Allergic rhinitis 09/16/2010    Hypercholesteremia 08/05/2010    Hypothyroid 08/05/2010     Current Outpatient Medications   Medication Sig Dispense Refill    OTHER Neurodrine 1 capsule daily      citalopram (CELEXA) 10 mg tablet Take 1 Tablet by mouth daily. 90 Tablet 0    levothyroxine (SYNTHROID) 75 mcg tablet TAKE 1 TABLET BY MOUTH EVERY DAY BEFORE BREAKFAST 90 Tablet 1    ascorbic acid (VITAMIN C PO) Take  by mouth. OTHER Take 1 Each by mouth daily. zeremin for dementia (Patient not taking: Reported on 9/19/2022)      cholecalciferol (VITAMIN D3) (2,000 UNITS /50 MCG) cap capsule Take 2,000 Units by mouth two (2) times a day. acetaminophen (TYLENOL) 500 mg tablet Take 500 mg by mouth as needed for Pain. (Patient not taking: Reported on 9/19/2022)       Allergies:  Other plant, animal, environmental; Penicillins; and Sulfa (sulfonamide antibiotics)  Past Medical History:   Diagnosis Date    Arthritis     Chronic pain 1998    feet pain    Hypercholesterolemia     Hypertension     borderline, no medication    Other ill-defined conditions(799.89)     peripheral artery disease in bilateral feet    Other ill-defined conditions(799.89)     incontinant of urine, chest cold dr appjuan j today    Rosacea 4/7/2011    Thyroid disease     Tobacco abuse, episodic 4/7/2011     Past Surgical History:   Procedure Laterality Date    ENDOSCOPY, COLON, DIAGNOSTIC      HX APPENDECTOMY      HX GYN      hysterectomy    HX KNEE REPLACEMENT      HX ORTHOPAEDIC      podiatry,arthroscopy on knees,mortons neuroma removed    HX ORTHOPAEDIC      knee repl;acement bilat 2017     HX UROLOGICAL      kidney stone removed    NE BREAST SURGERY PROCEDURE UNLISTED      right lumpectomy     Family History   Problem Relation Age of Onset    Heart Disease Brother     Hypertension Brother     Elevated Lipids Brother     Elevated Lipids Mother     Elevated Lipids Father     Breast Cancer Maternal Aunt 54    Breast Cancer Maternal Aunt         age unknown    Breast Cancer Maternal Aunt         age unknown     Social History     Tobacco Use    Smoking status: Former     Packs/day: 0.50     Years: 20.00     Pack years: 10.00     Types: Cigarettes     Quit date: 6/10/2013     Years since quittin.5    Smokeless tobacco: Never    Tobacco comments:     3-4 cigarettes monthly   Substance Use Topics    Alcohol use: Yes     Comment: monthly     Dementia has been fully diagnosed at least since 2019, probably predated that. She notes incontinence of urine, worse at night.  says he thinks she does okay during the day, has some frequency. At night she is wet. Never soaks the bed, but sometimes gets her clothing wet. She is reluctant to use pads while she sleeps. She does not get up at night to urinate. She does drink tea in the evening, sometimes only powders and she has one small glass of wine at dinner time usually. Feeling better with less anxiety, less depression symptoms on the serotonin inhibitor, been on that for six months. Blood pressure was normal.  She looked fine. She gained some weight back which is good. She was fluent, but confused. Sounds like she has overactive bladder symptoms. I said we could try a low dose tolterodine, warned about constipation, dry mouth. We will try it for 90 days, take one daily, see her back at the 90-day point.      Vitals:    22 0854   BP: 116/71   Pulse: 74   Resp: 18   Temp: 97.6 °F (36.4 °C)   TempSrc: Temporal   SpO2: 96%   Weight: 144 lb 12.8 oz (65.7 kg)   Height: 5' 3\" (1.6 m)     1. Decreased activities of daily living (ADL)  Slow decline    2. Urinary incontinence, nocturnal enuresis  New type issue no dysuria  - tolterodine ER (DETROL-LA) 2 mg ER capsule; Take 1 Capsule by mouth daily. Dispense: 30 Capsule; Refill: 2    3. Dysthymia  better    4. Depression, major, recurrent, mild (HCC)  bettter  - citalopram (CELEXA) 10 mg tablet; Take 1 Tablet by mouth daily. Dispense: 90 Tablet; Refill: 1    5.  OAB (overactive bladder)

## 2023-01-31 ENCOUNTER — TELEPHONE (OUTPATIENT)
Dept: INTERNAL MEDICINE CLINIC | Age: 83
End: 2023-01-31

## 2023-01-31 RX ORDER — ASPIRIN 81 MG/1
TABLET ORAL DAILY
COMMUNITY

## 2023-01-31 NOTE — TELEPHONE ENCOUNTER
New a fib had syncopal episode    Amiodorone started  by reina Rose off with citalopram      Asa 80 started

## 2023-02-14 ENCOUNTER — OFFICE VISIT (OUTPATIENT)
Dept: INTERNAL MEDICINE CLINIC | Age: 83
End: 2023-02-14
Payer: MEDICARE

## 2023-02-14 VITALS
HEART RATE: 58 BPM | TEMPERATURE: 98 F | WEIGHT: 144.8 LBS | HEIGHT: 63 IN | DIASTOLIC BLOOD PRESSURE: 88 MMHG | RESPIRATION RATE: 20 BRPM | BODY MASS INDEX: 25.66 KG/M2 | OXYGEN SATURATION: 99 % | SYSTOLIC BLOOD PRESSURE: 120 MMHG

## 2023-02-14 DIAGNOSIS — F02.80 LATE ONSET ALZHEIMER'S DISEASE WITHOUT BEHAVIORAL DISTURBANCE (HCC): ICD-10-CM

## 2023-02-14 DIAGNOSIS — L30.4 INTERTRIGO: ICD-10-CM

## 2023-02-14 DIAGNOSIS — I48.0 PAROXYSMAL ATRIAL FIBRILLATION WITH RAPID VENTRICULAR RESPONSE (HCC): Primary | ICD-10-CM

## 2023-02-14 DIAGNOSIS — F33.0 DEPRESSION, MAJOR, RECURRENT, MILD (HCC): ICD-10-CM

## 2023-02-14 DIAGNOSIS — G30.1 LATE ONSET ALZHEIMER'S DISEASE WITHOUT BEHAVIORAL DISTURBANCE (HCC): ICD-10-CM

## 2023-02-14 PROCEDURE — 1101F PT FALLS ASSESS-DOCD LE1/YR: CPT | Performed by: INTERNAL MEDICINE

## 2023-02-14 PROCEDURE — G8427 DOCREV CUR MEDS BY ELIG CLIN: HCPCS | Performed by: INTERNAL MEDICINE

## 2023-02-14 PROCEDURE — 99214 OFFICE O/P EST MOD 30 MIN: CPT | Performed by: INTERNAL MEDICINE

## 2023-02-14 PROCEDURE — G9717 DOC PT DX DEP/BP F/U NT REQ: HCPCS | Performed by: INTERNAL MEDICINE

## 2023-02-14 PROCEDURE — G8417 CALC BMI ABV UP PARAM F/U: HCPCS | Performed by: INTERNAL MEDICINE

## 2023-02-14 PROCEDURE — G8399 PT W/DXA RESULTS DOCUMENT: HCPCS | Performed by: INTERNAL MEDICINE

## 2023-02-14 PROCEDURE — 1090F PRES/ABSN URINE INCON ASSESS: CPT | Performed by: INTERNAL MEDICINE

## 2023-02-14 PROCEDURE — G8536 NO DOC ELDER MAL SCRN: HCPCS | Performed by: INTERNAL MEDICINE

## 2023-02-14 RX ORDER — AMIODARONE HYDROCHLORIDE 200 MG/1
TABLET ORAL
COMMUNITY
Start: 2023-01-26

## 2023-02-14 RX ORDER — NYSTATIN AND TRIAMCINOLONE ACETONIDE 100000; 1 [USP'U]/G; MG/G
CREAM TOPICAL 2 TIMES DAILY
Qty: 30 G | Refills: 0 | Status: SHIPPED | OUTPATIENT
Start: 2023-02-14

## 2023-02-14 NOTE — PROGRESS NOTES
Problem follow up:  Indiana Ray returns for follow up visit regarding hosp stay. she was seen 3 weeks ago in er diagnosed with paroxysmal a fib and treated with amiodorone. Workup was significant for echo doppler neg. Notes, labs, studies, imaging related to this problem during prior visit were available . Since that visit, she has improved. she has been compliant with prescribed treatment. Residual symptoms include: did not fill nasal irritation and breast irritation  New issues associated with this problem include: has not used amiodorone too  many side effects and card has not called her back. She is taking asa 81 mg felt too many issues for anticoagulant    Wt Readings from Last 3 Encounters:   02/14/23 144 lb 12.8 oz (65.7 kg)   12/19/22 144 lb 12.8 oz (65.7 kg)   09/19/22 135 lb (61.2 kg)     Has been fine    Prior encounter here and any available records are reviewed during this visit  Medication refills are given if needed  All recent lab and xray results are reviewed  With the patient and hosp notes reviewed today  Vitals:    02/14/23 1506   BP: 120/88   Pulse: (!) 58   Resp: 20   Temp: 98 °F (36.7 °C)   TempSrc: Temporal   SpO2: 99%   Weight: 144 lb 12.8 oz (65.7 kg)   Height: 5' 3\" (1.6 m)     Reg rhythm  S1 and S2 normal, no murmurs, clicks, gallops or rubs. Regular rate and rhythm. Chest is clear; no wheezes or rales. No edema or JVD. Confused  Skin has intertrigo under left breast    .1. Paroxysmal atrial fibrillation with rapid ventricular response ([address]  Get another opinion from husbands ep doc  - REFERRAL TO CARDIOLOGY    2. Late onset Alzheimer's disease without behavioral disturbance ([address]  Progressing     3. Depression, major, recurrent, mild ([address]  On ssri  feels it helps and anxiety less    4. Intertrigo  Advise soap and water daiy use cream till better  - nystatin-triamcinolone (MYCOLOG II) topical cream; Apply  to affected area two (2) times a day.   Dispense: 30 g; Refill: 0          Prolonged visit with greater than 50% of time of more than 30 minute visit spent counseling patient and family and formulation of care

## 2023-02-14 NOTE — PROGRESS NOTES
Patient here for hospital follow from [facility], had a fainting spell. No chief complaint on file. Vitals:    02/14/23 1506   Temp: 98 °F (36.7 °C)   TempSrc: Temporal   Weight: 144 lb 12.8 oz (65.7 kg)   PainSc:   0 - No pain       Current Outpatient Medications on File Prior to Visit   Medication Sig Dispense Refill    aspirin delayed-release 81 mg tablet Take  by mouth daily. citalopram (CELEXA) 10 mg tablet Take 1 Tablet by mouth daily. 90 Tablet 1    levothyroxine (SYNTHROID) 75 mcg tablet Take 1 Tablet by mouth Daily (before breakfast). 90 Tablet 1    tolterodine ER (DETROL-LA) 2 mg ER capsule Take 1 Capsule by mouth daily. 30 Capsule 2    OTHER Neurodrine 1 capsule daily      ascorbic acid (VITAMIN C PO) Take  by mouth. cholecalciferol (VITAMIN D3) (2,000 UNITS /50 MCG) cap capsule Take 2,000 Units by mouth two (2) times a day. amiodarone (CORDARONE) 200 mg tablet TAKE 1 TABLET BY MOUTH TWICE A DAY FOR 14 DAYS THEN REDUCE TO 1/2 TABLET DAILY (Patient not taking: Reported on 2/14/2023)       No current facility-administered medications on file prior to visit. Health Maintenance Due   Topic    DTaP/Tdap/Td series (1 - Tdap)    Shingles Vaccine (1 of 2)    COVID-19 Vaccine (4 - Booster for [employer] series)       1. \"Have you been to the ER, urgent care clinic since your last visit? Hospitalized since your last visit? \" Yes [facility]    2. \"Have you seen or consulted any other health care providers outside of the [address] [place] [address] since your last visit? \"  No    3. For patients aged 39-70: Has the patient had a colonoscopy / FIT/ Cologuard? No      If the patient is female:    4. For patients aged 41-77: Has the patient had a mammogram within the past 2 years? No      5. For patients aged 21-65: Has the patient had a pap smear?  NA - based on age or sex

## 2023-02-20 ENCOUNTER — TELEPHONE (OUTPATIENT)
Dept: INTERNAL MEDICINE CLINIC | Age: 83
End: 2023-02-20

## 2023-02-20 NOTE — TELEPHONE ENCOUNTER
[facility] called for medical records. Advised them to send over records request, and I think they wanted it taken care of by the end of the day.

## 2023-02-20 NOTE — TELEPHONE ENCOUNTER
Calling in a second time about records. After I told her that I was awaiting records request form, she said she would check with  to see if they had sent it.

## 2023-03-16 DIAGNOSIS — N39.44 URINARY INCONTINENCE, NOCTURNAL ENURESIS: ICD-10-CM

## 2023-03-16 RX ORDER — TOLTERODINE 2 MG/1
CAPSULE, EXTENDED RELEASE ORAL
Qty: 90 CAPSULE | Refills: 1 | Status: SHIPPED | OUTPATIENT
Start: 2023-03-16

## 2023-03-23 DIAGNOSIS — L30.4 INTERTRIGO: ICD-10-CM

## 2023-03-23 RX ORDER — NYSTATIN AND TRIAMCINOLONE ACETONIDE 100000; 1 [USP'U]/G; MG/G
CREAM TOPICAL
Qty: 30 G | Refills: 0 | Status: SHIPPED | OUTPATIENT
Start: 2023-03-23

## 2023-05-16 ENCOUNTER — OFFICE VISIT (OUTPATIENT)
Age: 83
End: 2023-05-16
Payer: MEDICARE

## 2023-05-16 VITALS
DIASTOLIC BLOOD PRESSURE: 61 MMHG | OXYGEN SATURATION: 97 % | BODY MASS INDEX: 26.61 KG/M2 | TEMPERATURE: 98.3 F | RESPIRATION RATE: 20 BRPM | SYSTOLIC BLOOD PRESSURE: 130 MMHG | WEIGHT: 150.2 LBS | HEART RATE: 62 BPM | HEIGHT: 63 IN

## 2023-05-16 DIAGNOSIS — F33.0 MAJOR DEPRESSIVE DISORDER, RECURRENT, MILD (HCC): ICD-10-CM

## 2023-05-16 DIAGNOSIS — L30.4 ERYTHEMA INTERTRIGO: ICD-10-CM

## 2023-05-16 DIAGNOSIS — F02.80 DEMENTIA IN OTHER DISEASES CLASSIFIED ELSEWHERE, UNSPECIFIED SEVERITY, WITHOUT BEHAVIORAL DISTURBANCE, PSYCHOTIC DISTURBANCE, MOOD DISTURBANCE, AND ANXIETY (HCC): ICD-10-CM

## 2023-05-16 DIAGNOSIS — E03.8 HYPOTHYROIDISM DUE TO HASHIMOTO'S THYROIDITIS: ICD-10-CM

## 2023-05-16 DIAGNOSIS — D75.1 POLYCYTHEMIA, SECONDARY: ICD-10-CM

## 2023-05-16 DIAGNOSIS — E06.3 HYPOTHYROIDISM DUE TO HASHIMOTO'S THYROIDITIS: ICD-10-CM

## 2023-05-16 DIAGNOSIS — F02.80 DEMENTIA IN OTHER DISEASES CLASSIFIED ELSEWHERE, UNSPECIFIED SEVERITY, WITHOUT BEHAVIORAL DISTURBANCE, PSYCHOTIC DISTURBANCE, MOOD DISTURBANCE, AND ANXIETY (HCC): Primary | ICD-10-CM

## 2023-05-16 PROCEDURE — G8399 PT W/DXA RESULTS DOCUMENT: HCPCS | Performed by: INTERNAL MEDICINE

## 2023-05-16 PROCEDURE — 1090F PRES/ABSN URINE INCON ASSESS: CPT | Performed by: INTERNAL MEDICINE

## 2023-05-16 PROCEDURE — G8427 DOCREV CUR MEDS BY ELIG CLIN: HCPCS | Performed by: INTERNAL MEDICINE

## 2023-05-16 PROCEDURE — 99214 OFFICE O/P EST MOD 30 MIN: CPT | Performed by: INTERNAL MEDICINE

## 2023-05-16 PROCEDURE — 4004F PT TOBACCO SCREEN RCVD TLK: CPT | Performed by: INTERNAL MEDICINE

## 2023-05-16 PROCEDURE — 1123F ACP DISCUSS/DSCN MKR DOCD: CPT | Performed by: INTERNAL MEDICINE

## 2023-05-16 PROCEDURE — G8419 CALC BMI OUT NRM PARAM NOF/U: HCPCS | Performed by: INTERNAL MEDICINE

## 2023-05-16 RX ORDER — NYSTATIN 100000 [USP'U]/G
POWDER TOPICAL
Qty: 60 G | Refills: 4 | Status: SHIPPED | OUTPATIENT
Start: 2023-05-16

## 2023-05-16 SDOH — ECONOMIC STABILITY: FOOD INSECURITY: WITHIN THE PAST 12 MONTHS, THE FOOD YOU BOUGHT JUST DIDN'T LAST AND YOU DIDN'T HAVE MONEY TO GET MORE.: NEVER TRUE

## 2023-05-16 SDOH — ECONOMIC STABILITY: FOOD INSECURITY: WITHIN THE PAST 12 MONTHS, YOU WORRIED THAT YOUR FOOD WOULD RUN OUT BEFORE YOU GOT MONEY TO BUY MORE.: NEVER TRUE

## 2023-05-16 SDOH — ECONOMIC STABILITY: INCOME INSECURITY: HOW HARD IS IT FOR YOU TO PAY FOR THE VERY BASICS LIKE FOOD, HOUSING, MEDICAL CARE, AND HEATING?: NOT HARD AT ALL

## 2023-05-16 SDOH — ECONOMIC STABILITY: HOUSING INSECURITY
IN THE LAST 12 MONTHS, WAS THERE A TIME WHEN YOU DID NOT HAVE A STEADY PLACE TO SLEEP OR SLEPT IN A SHELTER (INCLUDING NOW)?: NO

## 2023-05-16 ASSESSMENT — PATIENT HEALTH QUESTIONNAIRE - PHQ9
2. FEELING DOWN, DEPRESSED OR HOPELESS: 0
6. FEELING BAD ABOUT YOURSELF - OR THAT YOU ARE A FAILURE OR HAVE LET YOURSELF OR YOUR FAMILY DOWN: 0
SUM OF ALL RESPONSES TO PHQ QUESTIONS 1-9: 0
9. THOUGHTS THAT YOU WOULD BE BETTER OFF DEAD, OR OF HURTING YOURSELF: 0
10. IF YOU CHECKED OFF ANY PROBLEMS, HOW DIFFICULT HAVE THESE PROBLEMS MADE IT FOR YOU TO DO YOUR WORK, TAKE CARE OF THINGS AT HOME, OR GET ALONG WITH OTHER PEOPLE: 0
SUM OF ALL RESPONSES TO PHQ QUESTIONS 1-9: 0
SUM OF ALL RESPONSES TO PHQ QUESTIONS 1-9: 0
SUM OF ALL RESPONSES TO PHQ9 QUESTIONS 1 & 2: 0
3. TROUBLE FALLING OR STAYING ASLEEP: 0
4. FEELING TIRED OR HAVING LITTLE ENERGY: 0
SUM OF ALL RESPONSES TO PHQ QUESTIONS 1-9: 0
8. MOVING OR SPEAKING SO SLOWLY THAT OTHER PEOPLE COULD HAVE NOTICED. OR THE OPPOSITE, BEING SO FIGETY OR RESTLESS THAT YOU HAVE BEEN MOVING AROUND A LOT MORE THAN USUAL: 0
7. TROUBLE CONCENTRATING ON THINGS, SUCH AS READING THE NEWSPAPER OR WATCHING TELEVISION: 0
1. LITTLE INTEREST OR PLEASURE IN DOING THINGS: 0
SUM OF ALL RESPONSES TO PHQ QUESTIONS 1-9: 0
SUM OF ALL RESPONSES TO PHQ QUESTIONS 1-9: 0
5. POOR APPETITE OR OVEREATING: 0
SUM OF ALL RESPONSES TO PHQ9 QUESTIONS 1 & 2: 0
SUM OF ALL RESPONSES TO PHQ QUESTIONS 1-9: 0
2. FEELING DOWN, DEPRESSED OR HOPELESS: 0
1. LITTLE INTEREST OR PLEASURE IN DOING THINGS: 0
SUM OF ALL RESPONSES TO PHQ QUESTIONS 1-9: 0

## 2023-05-16 NOTE — PROGRESS NOTES
Patient is here for routine follow up. No chief complaint on file. [unfilled]    Current Outpatient Medications on File Prior to Visit   Medication Sig Dispense Refill    amiodarone (CORDARONE) 200 MG tablet TAKE 1 TABLET BY MOUTH TWICE A DAY FOR 14 DAYS THEN REDUCE TO 1/2 TABLET DAILY      aspirin 81 MG EC tablet Take by mouth daily      Cholecalciferol 50 MCG (2000 UT) CAPS Take by mouth 2 times daily      citalopram (CELEXA) 10 MG tablet Take by mouth daily      levothyroxine (SYNTHROID) 75 MCG tablet Take by mouth every morning (before breakfast)      nystatin-triamcinolone (MYCOLOG II) 046685-9.1 UNIT/GM-% cream Apply topically 2 times daily      tolterodine (DETROL LA) 2 MG extended release capsule Take by mouth daily       No current facility-administered medications on file prior to visit. Health Maintenance Due   Topic    DTaP/Tdap/Td vaccine (1 - Tdap)    Shingles vaccine (1 of 2)    Pneumococcal 65+ years Vaccine (2 - PPSV23 if available, else PCV20)    COVID-19 Vaccine (4 - Booster for [employer] series)    Annual Wellness Visit (AWV)        1. \"Have you been to the ER, urgent care clinic since your last visit? Hospitalized since your last visit? \" No    2. \"Have you seen or consulted any other health care providers outside of the [address] [place] [address] since your last visit? \" No    3. For patients aged 39-70: Has the patient had a colonoscopy / FIT/ Cologuard? No      If the patient is female:    4. For patients aged 41-77: Has the patient had a mammogram within the past 2 years? No      5. For patients aged 21-65: Has the patient had a pap smear?  No

## 2023-05-16 NOTE — PROGRESS NOTES
Subjective:   Dierdre Seip is a 79 y.o. female  here for follow up of chronic medical conditions listed   Patient Active Problem List    Diagnosis Date Noted    Depression, major, recurrent, mild ([facility] [address] 09/21/2020    Late onset Alzheimer's disease without behavioral disturbance ([facility] 06/22/2020    Spinal stenosis at L4-L5 level 07/29/2019    Subclavian steal syndrome 09/14/2018    Polycythemia, secondary 02/12/2014    Elevated blood pressure reading without diagnosis of hypertension 04/07/2011    Rosacea 04/07/2011    Allergic rhinitis 09/16/2010    Hypercholesteremia 08/05/2010    Hypothyroid 08/05/2010     Current Outpatient Medications   Medication Sig Dispense Refill    aspirin 81 MG EC tablet Take by mouth daily      Cholecalciferol 50 MCG (2000 UT) CAPS Take by mouth 2 times daily      citalopram (CELEXA) 10 MG tablet Take by mouth daily      levothyroxine (SYNTHROID) 75 MCG tablet Take by mouth every morning (before breakfast)      nystatin-triamcinolone (MYCOLOG II) 756018-9.1 UNIT/GM-% cream Apply topically 2 times daily       No current facility-administered medications for this visit. Past Medical History:   Diagnosis Date    Arthritis     Atrial fibrillation ([facility]     Chronic pain 01/01/1998    feet pain    Hypercholesterolemia     Hypertension     borderline, no medication    Other ill-defined conditions(799.89)     incontinant of urine, chest cold dr appt today    Other ill-defined conditions(799.89)     peripheral artery disease in bilateral feet    Rosacea 04/07/2011    Thyroid disease     Tobacco abuse, episodic 04/07/2011     Past Surgical History:   Procedure Laterality Date    APPENDECTOMY      BREAST SURGERY      right lumpectomy    COLONOSCOPY      GYN      hysterectomy    ORTHOPEDIC SURGERY      knee repl;acement bilat 2017 2018    ORTHOPEDIC SURGERY      podiatry,arthroscopy on knees,mortons neuroma removed    TOTAL KNEE ARTHROPLASTY      UROLOGICAL SURGERY      kidney stone removed   .

## 2023-05-17 LAB
ALBUMIN SERPL-MCNC: 3.6 G/DL (ref 3.5–5)
ALBUMIN/GLOB SERPL: 1.2 (ref 1.1–2.2)
ALP SERPL-CCNC: 94 U/L (ref 45–117)
ALT SERPL-CCNC: 20 U/L (ref 12–78)
ANION GAP SERPL CALC-SCNC: 3 MMOL/L (ref 5–15)
AST SERPL-CCNC: 20 U/L (ref 15–37)
BASOPHILS # BLD: 0 K/UL (ref 0–0.1)
BASOPHILS NFR BLD: 0 % (ref 0–1)
BILIRUB SERPL-MCNC: 0.8 MG/DL (ref 0.2–1)
BUN SERPL-MCNC: 25 MG/DL (ref 6–20)
BUN/CREAT SERPL: 25 (ref 12–20)
CALCIUM SERPL-MCNC: 9 MG/DL (ref 8.5–10.1)
CHLORIDE SERPL-SCNC: 108 MMOL/L (ref 97–108)
CO2 SERPL-SCNC: 29 MMOL/L (ref 21–32)
CREAT SERPL-MCNC: 1 MG/DL (ref 0.55–1.02)
DIFFERENTIAL METHOD BLD: ABNORMAL
EOSINOPHIL # BLD: 0.3 K/UL (ref 0–0.4)
EOSINOPHIL NFR BLD: 3 % (ref 0–7)
ERYTHROCYTE [DISTWIDTH] IN BLOOD BY AUTOMATED COUNT: 12.8 % (ref 11.5–14.5)
GLOBULIN SER CALC-MCNC: 3.1 G/DL (ref 2–4)
GLUCOSE SERPL-MCNC: 81 MG/DL (ref 65–100)
HCT VFR BLD AUTO: 47.9 % (ref 35–47)
HGB BLD-MCNC: 14.7 G/DL (ref 11.5–16)
IMM GRANULOCYTES # BLD AUTO: 0 K/UL (ref 0–0.04)
IMM GRANULOCYTES NFR BLD AUTO: 0 % (ref 0–0.5)
LYMPHOCYTES # BLD: 2.5 K/UL (ref 0.8–3.5)
LYMPHOCYTES NFR BLD: 29 % (ref 12–49)
MCH RBC QN AUTO: 29.5 PG (ref 26–34)
MCHC RBC AUTO-ENTMCNC: 30.7 G/DL (ref 30–36.5)
MCV RBC AUTO: 96.2 FL (ref 80–99)
MONOCYTES # BLD: 0.7 K/UL (ref 0–1)
MONOCYTES NFR BLD: 8 % (ref 5–13)
NEUTS SEG # BLD: 5.1 K/UL (ref 1.8–8)
NEUTS SEG NFR BLD: 60 % (ref 32–75)
NRBC # BLD: 0 K/UL (ref 0–0.01)
NRBC BLD-RTO: 0 PER 100 WBC
PLATELET # BLD AUTO: 239 K/UL (ref 150–400)
PMV BLD AUTO: 11.8 FL (ref 8.9–12.9)
POTASSIUM SERPL-SCNC: 4.6 MMOL/L (ref 3.5–5.1)
PROT SERPL-MCNC: 6.7 G/DL (ref 6.4–8.2)
RBC # BLD AUTO: 4.98 M/UL (ref 3.8–5.2)
SODIUM SERPL-SCNC: 140 MMOL/L (ref 136–145)
TSH SERPL DL<=0.05 MIU/L-ACNC: 0.62 UIU/ML (ref 0.36–3.74)
WBC # BLD AUTO: 8.5 K/UL (ref 3.6–11)

## 2023-07-26 DIAGNOSIS — F33.0 MAJOR DEPRESSIVE DISORDER, RECURRENT, MILD (HCC): ICD-10-CM

## 2023-07-26 RX ORDER — CITALOPRAM HYDROBROMIDE 10 MG/1
TABLET ORAL
Qty: 90 TABLET | Refills: 1 | Status: SHIPPED | OUTPATIENT
Start: 2023-07-26

## 2023-07-27 RX ORDER — LEVOTHYROXINE SODIUM 0.07 MG/1
TABLET ORAL
Qty: 90 TABLET | Refills: 1 | OUTPATIENT
Start: 2023-07-27

## 2023-07-27 RX ORDER — LEVOTHYROXINE SODIUM 0.07 MG/1
75 TABLET ORAL
Qty: 90 TABLET | Refills: 1 | Status: SHIPPED | OUTPATIENT
Start: 2023-07-27

## 2023-07-28 ENCOUNTER — TELEPHONE (OUTPATIENT)
Age: 83
End: 2023-07-28

## 2023-07-28 NOTE — TELEPHONE ENCOUNTER
Trina Melton from [facility] is calling about paperwork rc'vd she says  box needs to be checked that they are free of communicable diseases.     [facility] phone: [phone]

## 2023-08-03 ENCOUNTER — OFFICE VISIT (OUTPATIENT)
Age: 83
End: 2023-08-03
Payer: MEDICARE

## 2023-08-03 VITALS
DIASTOLIC BLOOD PRESSURE: 41 MMHG | TEMPERATURE: 98.1 F | HEART RATE: 63 BPM | RESPIRATION RATE: 20 BRPM | SYSTOLIC BLOOD PRESSURE: 91 MMHG | OXYGEN SATURATION: 98 % | BODY MASS INDEX: 26.93 KG/M2 | WEIGHT: 152 LBS | HEIGHT: 63 IN

## 2023-08-03 DIAGNOSIS — N39.0 URINARY TRACT INFECTION WITHOUT HEMATURIA, SITE UNSPECIFIED: ICD-10-CM

## 2023-08-03 DIAGNOSIS — R41.82 ALTERED MENTAL STATUS, UNSPECIFIED ALTERED MENTAL STATUS TYPE: ICD-10-CM

## 2023-08-03 DIAGNOSIS — F03.90 DEMENTIA, UNSPECIFIED DEMENTIA SEVERITY, UNSPECIFIED DEMENTIA TYPE, UNSPECIFIED WHETHER BEHAVIORAL, PSYCHOTIC, OR MOOD DISTURBANCE OR ANXIETY (HCC): Primary | ICD-10-CM

## 2023-08-03 LAB
BILIRUBIN, URINE, POC: NEGATIVE
BLOOD URINE, POC: NEGATIVE
GLUCOSE URINE, POC: NEGATIVE
KETONES, URINE, POC: NEGATIVE
LEUKOCYTE ESTERASE, URINE, POC: NORMAL
NITRITE, URINE, POC: NEGATIVE
PH, URINE, POC: 6 (ref 4.6–8)
PROTEIN,URINE, POC: NEGATIVE
SPECIFIC GRAVITY, URINE, POC: 1.02 (ref 1–1.03)
URINALYSIS CLARITY, POC: CLEAR
URINALYSIS COLOR, POC: YELLOW
UROBILINOGEN, POC: NORMAL

## 2023-08-03 PROCEDURE — G8399 PT W/DXA RESULTS DOCUMENT: HCPCS | Performed by: INTERNAL MEDICINE

## 2023-08-03 PROCEDURE — 1036F TOBACCO NON-USER: CPT | Performed by: INTERNAL MEDICINE

## 2023-08-03 PROCEDURE — 1123F ACP DISCUSS/DSCN MKR DOCD: CPT | Performed by: INTERNAL MEDICINE

## 2023-08-03 PROCEDURE — G8427 DOCREV CUR MEDS BY ELIG CLIN: HCPCS | Performed by: INTERNAL MEDICINE

## 2023-08-03 PROCEDURE — 1090F PRES/ABSN URINE INCON ASSESS: CPT | Performed by: INTERNAL MEDICINE

## 2023-08-03 PROCEDURE — 81001 URINALYSIS AUTO W/SCOPE: CPT | Performed by: INTERNAL MEDICINE

## 2023-08-03 PROCEDURE — G8419 CALC BMI OUT NRM PARAM NOF/U: HCPCS | Performed by: INTERNAL MEDICINE

## 2023-08-03 PROCEDURE — 99214 OFFICE O/P EST MOD 30 MIN: CPT | Performed by: INTERNAL MEDICINE

## 2023-08-03 PROCEDURE — PBSHW AMB POC URINALYSIS DIP STICK AUTO W/ MICRO: Performed by: INTERNAL MEDICINE

## 2023-08-03 RX ORDER — NITROFURANTOIN 25; 75 MG/1; MG/1
100 CAPSULE ORAL 2 TIMES DAILY
Qty: 20 CAPSULE | Refills: 0 | Status: SHIPPED | OUTPATIENT
Start: 2023-08-03 | End: 2023-08-13

## 2023-08-03 NOTE — PROGRESS NOTES
Patient is here for UTI. 1. \"Have you been to the ER, urgent care clinic since your last visit? Hospitalized since your last visit? \" No    2. \"Have you seen or consulted any other health care providers outside of the [address] [place] [address] since your last visit? \" No     3. For patients aged 43-73: Has the patient had a colonoscopy / FIT/ Cologuard? NA - based on age      If the patient is female:    4. For patients aged 43-66: Has the patient had a mammogram within the past 2 years? NA - based on age or sex      11. For patients aged 21-65: Has the patient had a pap smear?  NA - based on age or sex

## 2023-08-04 ENCOUNTER — TELEPHONE (OUTPATIENT)
Age: 83
End: 2023-08-04

## 2023-08-04 LAB
ALBUMIN SERPL-MCNC: 3.1 G/DL (ref 3.5–5)
ALBUMIN/GLOB SERPL: 1 (ref 1.1–2.2)
ALP SERPL-CCNC: 101 U/L (ref 45–117)
ALT SERPL-CCNC: 17 U/L (ref 12–78)
ANION GAP SERPL CALC-SCNC: 5 MMOL/L (ref 5–15)
AST SERPL-CCNC: 17 U/L (ref 15–37)
BASOPHILS # BLD: 0 K/UL (ref 0–0.1)
BASOPHILS NFR BLD: 0 % (ref 0–1)
BILIRUB SERPL-MCNC: 0.6 MG/DL (ref 0.2–1)
BUN SERPL-MCNC: 21 MG/DL (ref 6–20)
BUN/CREAT SERPL: 19 (ref 12–20)
CALCIUM SERPL-MCNC: 9.1 MG/DL (ref 8.5–10.1)
CHLORIDE SERPL-SCNC: 107 MMOL/L (ref 97–108)
CO2 SERPL-SCNC: 29 MMOL/L (ref 21–32)
CREAT SERPL-MCNC: 1.09 MG/DL (ref 0.55–1.02)
DIFFERENTIAL METHOD BLD: NORMAL
EOSINOPHIL # BLD: 0.3 K/UL (ref 0–0.4)
EOSINOPHIL NFR BLD: 3 % (ref 0–7)
ERYTHROCYTE [DISTWIDTH] IN BLOOD BY AUTOMATED COUNT: 12.5 % (ref 11.5–14.5)
GLOBULIN SER CALC-MCNC: 3 G/DL (ref 2–4)
GLUCOSE SERPL-MCNC: 85 MG/DL (ref 65–100)
HCT VFR BLD AUTO: 43.2 % (ref 35–47)
HGB BLD-MCNC: 13.4 G/DL (ref 11.5–16)
IMM GRANULOCYTES # BLD AUTO: 0 K/UL (ref 0–0.04)
IMM GRANULOCYTES NFR BLD AUTO: 0 % (ref 0–0.5)
LYMPHOCYTES # BLD: 3 K/UL (ref 0.8–3.5)
LYMPHOCYTES NFR BLD: 36 % (ref 12–49)
MCH RBC QN AUTO: 29.6 PG (ref 26–34)
MCHC RBC AUTO-ENTMCNC: 31 G/DL (ref 30–36.5)
MCV RBC AUTO: 95.4 FL (ref 80–99)
MONOCYTES # BLD: 0.8 K/UL (ref 0–1)
MONOCYTES NFR BLD: 10 % (ref 5–13)
NEUTS SEG # BLD: 4.2 K/UL (ref 1.8–8)
NEUTS SEG NFR BLD: 51 % (ref 32–75)
NRBC # BLD: 0 K/UL (ref 0–0.01)
NRBC BLD-RTO: 0 PER 100 WBC
PLATELET # BLD AUTO: 292 K/UL (ref 150–400)
PMV BLD AUTO: 10.8 FL (ref 8.9–12.9)
POTASSIUM SERPL-SCNC: 4.3 MMOL/L (ref 3.5–5.1)
PROT SERPL-MCNC: 6.1 G/DL (ref 6.4–8.2)
RBC # BLD AUTO: 4.53 M/UL (ref 3.8–5.2)
SODIUM SERPL-SCNC: 141 MMOL/L (ref 136–145)
TSH SERPL DL<=0.05 MIU/L-ACNC: 0.36 UIU/ML (ref 0.36–3.74)
WBC # BLD AUTO: 8.3 K/UL (ref 3.6–11)

## 2023-08-04 ASSESSMENT — ENCOUNTER SYMPTOMS
SHORTNESS OF BREATH: 0
COUGH: 0

## 2023-08-04 NOTE — TELEPHONE ENCOUNTER
Called patient [employer] mobile number is disconnected and home phone rings forever then hangs up. Called patient to inform her of her last lab results       \"Please call patient's daughter/contact, Results show signs of dehydration and slighly lower protein level. Complete antibiotics, drink enough fluid and protein, taking one ensure/protein boost daily will help with swelling.  In addition, turn lights off during the night/sleeping time,  let lights come in to the house during the day\"

## 2023-08-05 LAB
BACTERIA SPEC CULT: NORMAL
CC UR VC: NORMAL
LACTATE SERPL-SCNC: 0.7 MMOL/L (ref 0.4–2)
SERVICE CMNT-IMP: NORMAL

## 2023-08-22 ENCOUNTER — OFFICE VISIT (OUTPATIENT)
Age: 83
End: 2023-08-22
Payer: MEDICARE

## 2023-08-22 VITALS
DIASTOLIC BLOOD PRESSURE: 75 MMHG | TEMPERATURE: 98 F | RESPIRATION RATE: 20 BRPM | SYSTOLIC BLOOD PRESSURE: 136 MMHG | BODY MASS INDEX: 26.93 KG/M2 | OXYGEN SATURATION: 97 % | HEART RATE: 62 BPM | WEIGHT: 152 LBS | HEIGHT: 63 IN

## 2023-08-22 DIAGNOSIS — F05 SUNDOWN SYNDROME: Primary | ICD-10-CM

## 2023-08-22 DIAGNOSIS — E06.3 HYPOTHYROIDISM DUE TO HASHIMOTO'S THYROIDITIS: ICD-10-CM

## 2023-08-22 DIAGNOSIS — E03.8 HYPOTHYROIDISM DUE TO HASHIMOTO'S THYROIDITIS: ICD-10-CM

## 2023-08-22 DIAGNOSIS — F03.90 DEMENTIA, UNSPECIFIED DEMENTIA SEVERITY, UNSPECIFIED DEMENTIA TYPE, UNSPECIFIED WHETHER BEHAVIORAL, PSYCHOTIC, OR MOOD DISTURBANCE OR ANXIETY (HCC): ICD-10-CM

## 2023-08-22 DIAGNOSIS — R60.9 DEPENDENT EDEMA: ICD-10-CM

## 2023-08-22 PROCEDURE — 1090F PRES/ABSN URINE INCON ASSESS: CPT | Performed by: INTERNAL MEDICINE

## 2023-08-22 PROCEDURE — G8419 CALC BMI OUT NRM PARAM NOF/U: HCPCS | Performed by: INTERNAL MEDICINE

## 2023-08-22 PROCEDURE — 99214 OFFICE O/P EST MOD 30 MIN: CPT | Performed by: INTERNAL MEDICINE

## 2023-08-22 PROCEDURE — G8427 DOCREV CUR MEDS BY ELIG CLIN: HCPCS | Performed by: INTERNAL MEDICINE

## 2023-08-22 PROCEDURE — 1123F ACP DISCUSS/DSCN MKR DOCD: CPT | Performed by: INTERNAL MEDICINE

## 2023-08-22 PROCEDURE — 1036F TOBACCO NON-USER: CPT | Performed by: INTERNAL MEDICINE

## 2023-08-22 PROCEDURE — G8399 PT W/DXA RESULTS DOCUMENT: HCPCS | Performed by: INTERNAL MEDICINE

## 2023-08-22 RX ORDER — LANOLIN ALCOHOL/MO/W.PET/CERES
3 CREAM (GRAM) TOPICAL
Qty: 30 TABLET | Refills: 5 | Status: SHIPPED | OUTPATIENT
Start: 2023-08-22

## 2023-08-22 NOTE — PROGRESS NOTES
Subjective:   Berna Clemens is a 79 y.o. female  here for follow up of chronic medical conditions listed   Patient Active Problem List    Diagnosis Date Noted    Depression, major, recurrent, mild ([address]) 09/21/2020    Late onset Alzheimer's disease without behavioral disturbance ([address]) 06/22/2020    Spinal stenosis at L4-L5 level 07/29/2019    Subclavian steal syndrome 09/14/2018    Polycythemia, secondary 02/12/2014    Elevated blood pressure reading without diagnosis of hypertension 04/07/2011    Rosacea 04/07/2011    Allergic rhinitis 09/16/2010    Hypercholesteremia 08/05/2010    Hypothyroid 08/05/2010     Current Outpatient Medications   Medication Sig Dispense Refill    melatonin (RA MELATONIN) 3 MG TABS tablet Take 1 tablet by mouth nightly 30 tablet 5    levothyroxine (SYNTHROID) 75 MCG tablet Take 1 tablet by mouth every morning (before breakfast) 90 tablet 1    citalopram (CELEXA) 10 MG tablet TAKE 1 TABLET BY MOUTH EVERY DAY 90 tablet 1     No current facility-administered medications for this visit. Past Medical History:   Diagnosis Date    Arthritis     Atrial fibrillation ([address])     Chronic pain 01/01/1998    feet pain    Hypercholesterolemia     Hypertension     borderline, no medication    Other ill-defined conditions(799.89)     incontinant of urine, chest cold dr appt today    Other ill-defined conditions(799.89)     peripheral artery disease in bilateral feet    Rosacea 04/07/2011    Thyroid disease     Tobacco abuse, episodic 04/07/2011     Past Surgical History:   Procedure Laterality Date    APPENDECTOMY      BREAST SURGERY      right lumpectomy    COLONOSCOPY      GYN      hysterectomy    ORTHOPEDIC SURGERY      knee repl;acement bilat 2017 2018    ORTHOPEDIC SURGERY      podiatry,arthroscopy on knees,mortons neuroma removed    TOTAL KNEE ARTHROPLASTY      UROLOGICAL SURGERY      kidney stone removed   . She  is accompanied by patient and .  She lives as follows: a spouse and been

## 2023-08-22 NOTE — PROGRESS NOTES
Patient here for 3 month follow up. 1. \"Have you been to the ER, urgent care clinic since your last visit? Hospitalized since your last visit? \" No    2. \"Have you seen or consulted any other health care providers outside of the [address] [place] [address] since your last visit? \" No     3. For patients aged 43-73: Has the patient had a colonoscopy / FIT/ Cologuard? NA - based on age      If the patient is female:    4. For patients aged 43-66: Has the patient had a mammogram within the past 2 years? NA - based on age or sex      11. For patients aged 21-65: Has the patient had a pap smear?  NA - based on age or sex

## 2023-09-19 DIAGNOSIS — L30.4 ERYTHEMA INTERTRIGO: ICD-10-CM

## 2024-05-16 NOTE — TELEPHONE ENCOUNTER
Refill request received from [facility] for   Requested Prescriptions     Pending Prescriptions Disp Refills    levothyroxine (SYNTHROID) 75 MCG tablet [Pharmacy Med Name: LEVOTHYROXINE 75 MCG TABLET] 90 tablet 1     Sig: TAKE 1 TABLET BY MOUTH EVERY DAY BEFORE BREAKFAST     Last appointment: 8/22/2023   Next appointment: Visit date not found     Routed to Dr Yobany Terrell for review.

## 2024-05-17 RX ORDER — LEVOTHYROXINE SODIUM 0.07 MG/1
75 TABLET ORAL
Qty: 90 TABLET | Refills: 1 | Status: SHIPPED | OUTPATIENT
Start: 2024-05-17

## 2024-06-01 DIAGNOSIS — F33.0 MAJOR DEPRESSIVE DISORDER, RECURRENT, MILD (HCC): ICD-10-CM

## 2024-06-03 RX ORDER — CITALOPRAM HYDROBROMIDE 10 MG/1
TABLET ORAL
Qty: 90 TABLET | Refills: 1 | Status: SHIPPED | OUTPATIENT
Start: 2024-06-03

## 2024-06-03 NOTE — TELEPHONE ENCOUNTER
Refill request received from [facility]    for   Requested Prescriptions     Pending Prescriptions Disp Refills    citalopram (CELEXA) 10 MG tablet [Pharmacy Med Name: CITALOPRAM HBR 10 MG TABLET] 90 tablet 1     Sig: TAKE 1 TABLET BY MOUTH EVERY DAY     Last office visit: 8/22/2023   Next office visit: Visit date not found     Routed to Dr Yobany Terrell for review.     Clarita Hardin LPN

## 2024-08-28 RX ORDER — LEVOTHYROXINE SODIUM 75 UG/1
75 TABLET ORAL
Qty: 90 TABLET | Refills: 1 | OUTPATIENT
Start: 2024-08-28

## 2024-09-15 LAB
INR, EXTERNAL: 0.97
PT, EXTERNAL: 10.2